# Patient Record
Sex: MALE | Race: BLACK OR AFRICAN AMERICAN | NOT HISPANIC OR LATINO | Employment: FULL TIME | URBAN - METROPOLITAN AREA
[De-identification: names, ages, dates, MRNs, and addresses within clinical notes are randomized per-mention and may not be internally consistent; named-entity substitution may affect disease eponyms.]

---

## 2022-02-14 ENCOUNTER — HOSPITAL ENCOUNTER (EMERGENCY)
Facility: HOSPITAL | Age: 42
Discharge: HOME | End: 2022-02-14
Attending: EMERGENCY MEDICINE
Payer: COMMERCIAL

## 2022-02-14 VITALS
TEMPERATURE: 98 F | DIASTOLIC BLOOD PRESSURE: 99 MMHG | BODY MASS INDEX: 25.9 KG/M2 | HEART RATE: 85 BPM | WEIGHT: 185 LBS | SYSTOLIC BLOOD PRESSURE: 151 MMHG | HEIGHT: 71 IN | OXYGEN SATURATION: 100 % | RESPIRATION RATE: 16 BRPM

## 2022-02-14 DIAGNOSIS — I10 UNCONTROLLED HYPERTENSION: Primary | ICD-10-CM

## 2022-02-14 DIAGNOSIS — K40.90 REDUCIBLE LEFT INGUINAL HERNIA: ICD-10-CM

## 2022-02-14 DIAGNOSIS — K42.9 UMBILICAL HERNIA WITHOUT OBSTRUCTION AND WITHOUT GANGRENE: ICD-10-CM

## 2022-02-14 LAB
ALBUMIN SERPL BCP-MCNC: 3.7 G/DL (ref 3.5–5.2)
ALP SERPL-CCNC: 70 U/L (ref 55–135)
ALT SERPL W/O P-5'-P-CCNC: 19 U/L (ref 10–44)
ANION GAP SERPL CALC-SCNC: 4 MMOL/L (ref 8–16)
AST SERPL-CCNC: 20 U/L (ref 10–40)
BILIRUB SERPL-MCNC: 0.4 MG/DL (ref 0.1–1)
BUN SERPL-MCNC: 9 MG/DL (ref 6–20)
CALCIUM SERPL-MCNC: 9 MG/DL (ref 8.7–10.5)
CHLORIDE SERPL-SCNC: 110 MMOL/L (ref 95–110)
CO2 SERPL-SCNC: 24 MMOL/L (ref 23–29)
CREAT SERPL-MCNC: 1.2 MG/DL (ref 0.5–1.4)
EST. GFR  (AFRICAN AMERICAN): >60 ML/MIN/1.73 M^2
EST. GFR  (NON AFRICAN AMERICAN): >60 ML/MIN/1.73 M^2
GLUCOSE SERPL-MCNC: 90 MG/DL (ref 70–110)
POTASSIUM SERPL-SCNC: 3.9 MMOL/L (ref 3.5–5.1)
PROT SERPL-MCNC: 6.7 G/DL (ref 6–8.4)
SODIUM SERPL-SCNC: 138 MMOL/L (ref 136–145)

## 2022-02-14 PROCEDURE — 96374 THER/PROPH/DIAG INJ IV PUSH: CPT

## 2022-02-14 PROCEDURE — 99284 EMERGENCY DEPT VISIT MOD MDM: CPT | Mod: 25

## 2022-02-14 PROCEDURE — 63600175 PHARM REV CODE 636 W HCPCS: Performed by: EMERGENCY MEDICINE

## 2022-02-14 PROCEDURE — 25000003 PHARM REV CODE 250: Performed by: EMERGENCY MEDICINE

## 2022-02-14 PROCEDURE — 80053 COMPREHEN METABOLIC PANEL: CPT | Performed by: EMERGENCY MEDICINE

## 2022-02-14 RX ORDER — ACETAMINOPHEN 500 MG
1000 TABLET ORAL
Status: COMPLETED | OUTPATIENT
Start: 2022-02-14 | End: 2022-02-14

## 2022-02-14 RX ORDER — AMLODIPINE BESYLATE 5 MG/1
10 TABLET ORAL
Status: COMPLETED | OUTPATIENT
Start: 2022-02-14 | End: 2022-02-14

## 2022-02-14 RX ORDER — CLONIDINE HYDROCHLORIDE 0.1 MG/1
0.1 TABLET ORAL
Status: COMPLETED | OUTPATIENT
Start: 2022-02-14 | End: 2022-02-14

## 2022-02-14 RX ORDER — AMLODIPINE BESYLATE 10 MG/1
10 TABLET ORAL DAILY
Qty: 30 TABLET | Refills: 0 | Status: SHIPPED | OUTPATIENT
Start: 2022-02-14 | End: 2023-02-14

## 2022-02-14 RX ORDER — HYDRALAZINE HYDROCHLORIDE 20 MG/ML
10 INJECTION INTRAMUSCULAR; INTRAVENOUS
Status: COMPLETED | OUTPATIENT
Start: 2022-02-14 | End: 2022-02-14

## 2022-02-14 RX ADMIN — CLONIDINE HYDROCHLORIDE 0.1 MG: 0.1 TABLET ORAL at 11:02

## 2022-02-14 RX ADMIN — HYDRALAZINE HYDROCHLORIDE 10 MG: 20 INJECTION, SOLUTION INTRAMUSCULAR; INTRAVENOUS at 11:02

## 2022-02-14 RX ADMIN — AMLODIPINE BESYLATE 10 MG: 5 TABLET ORAL at 11:02

## 2022-02-14 RX ADMIN — ACETAMINOPHEN 1000 MG: 500 TABLET ORAL at 11:02

## 2022-02-14 NOTE — ED TRIAGE NOTES
Pt c/o pain in abd around umbilicus and left lower quad where pt has hernias.  Pr reports constant pain is an 8/10.  Sneezing and coughing makes the pain worse if he doesn't  Brace himself.   Last BM this am, last intake was about 0200 this am.

## 2022-02-14 NOTE — ED PROVIDER NOTES
Encounter Date: 2/14/2022       History     Chief Complaint   Patient presents with    Hernia     Pt to triage stating he has a Hernia on his L side that has been hurting worse for the last couple weeks. Pt also is starting a new job and needs to be cleared for work.    Headache     Pt states he has a slight headache for the last couple days on and off     HPI   This 41-year-old white male presents to the emergency room complaining that he has a periumbilical and left inguinal hernia that is been present for years.  His hernias are starting to become uncomfortable.  There is no real abdominal pain nausea vomiting.  Patient also complains of some mild frontal head pain.  He complains that his blood pressures been high.  He is without other injuries or problems.   Review of patient's allergies indicates:  No Known Allergies  Past Medical History:   Diagnosis Date    Hypertension     Recurrent boils      Past Surgical History:   Procedure Laterality Date    CYST REMOVAL       Family History   Problem Relation Age of Onset    Liver cancer Mother      Social History     Tobacco Use    Smoking status: Current Every Day Smoker     Packs/day: 0.50     Types: Cigarettes    Smokeless tobacco: Never Used    Tobacco comment: pack can last up to a week   Substance Use Topics    Alcohol use: Yes     Comment: socially    Drug use: No     Review of Systems  The patient was questioned specifically with regard to the following.  General: Fever, chills, sweats. Neuro: Headache. Eyes: eye problems. ENT: Ear pain, sore throat. Cardiovascular: Chest pain. Respiratory: Cough, shortness of breath. Gastrointestinal: Abdominal pain, vomiting, diarrhea. Genitourinary: Painful urination.  Musculoskeletal: Arm and leg problems. Skin: Rash.  The review of systems was negative except for the following:  Frontal head pain, hernia around the belly button, hernia in the left inguinal region.  High blood pressure  Physical Exam     Initial  Vitals   BP Pulse Resp Temp SpO2   02/14/22 1035 02/14/22 1035 02/14/22 1035 02/14/22 1154 02/14/22 1035   (!) 182/118 95 16 98 °F (36.7 °C) 99 %      MAP       --                Physical Exam  The patient was examined specifically for the following:   General:No significant distress, Good color, Warm and dry. Head and neck:Scalp atraumatic, Neck supple. Neurological:Appropriate conversation, Gross motor deficits. Eyes:Conjugate gaze, Clear corneas. ENT: No epistaxis. Cardiac: Regular rate and rhythm, Grossly normal heart tones. Pulmonary: Wheezing, Rales. Gastrointestinal: Abdominal tenderness, Abdominal distention. Musculoskeletal: Extremity deformity, Apparent pain with range of motion of the joints. Skin: Rash.   The findings on examination were normal except for the following:  Patient's blood pressure is 182/118.  Lungs are clear and free of wheezing rales rubs and rhonchi.  Heart tones are normal.  The patient has regular rate rhythm.  The abdomen is soft.  Extremities are nontender there is no guarding rebound mass or distention.  The patient has a left inguinal hernia that is easily reducible.  He has a hernia just superior to his umbilicus that is easily reducible.  There is no significant abdominal tenderness.  Mental status examination, cranial nerves, motor and sensory examination are normal.  The neck is supple.  ED Course   Procedures  Labs Reviewed   COMPREHENSIVE METABOLIC PANEL - Abnormal; Notable for the following components:       Result Value    Anion Gap 4 (*)     All other components within normal limits          Imaging Results    None          Medications   cloNIDine tablet 0.1 mg (0.1 mg Oral Given 2/14/22 1111)   amLODIPine tablet 10 mg (10 mg Oral Given 2/14/22 1111)   acetaminophen tablet 1,000 mg (1,000 mg Oral Given 2/14/22 1112)   hydrALAZINE injection 10 mg (10 mg Intravenous Given 2/14/22 1132)                          Clinical Impression:   Final diagnoses:  [I10] Uncontrolled  hypertension (Primary)  [K42.9] Umbilical hernia without obstruction and without gangrene  [K40.90] Reducible left inguinal hernia          ED Disposition Condition    Discharge Stable        ED Prescriptions     Medication Sig Dispense Start Date End Date Auth. Provider    amLODIPine (NORVASC) 10 MG tablet Take 1 tablet (10 mg total) by mouth once daily. 30 tablet 2/14/2022 2/14/2023 Jewel Matamoros MD        Follow-up Information     Follow up With Specialties Details Why Contact Info    Hamlet Funk MD Emergency Medicine In 3 days For blood pressure management [address] [address]  [address]  [facility] [address]  [phone]      Og Moreno MD General Surgery, Surgery In 1 week Call for an appointment today [address]  [address] [place] [address] [id number] [phone]             Jewel Matamoros MD  02/14/22 [id number]

## 2022-02-14 NOTE — DISCHARGE INSTRUCTIONS
Please begin with taking amlodipine, 10 mg by mouth daily.  Please return immediately if you get worse or if new problems develop.  Please take your blood pressure 3 times a day and write the values down.  Please follow-up with your primary care doctor for follow-up on your blood pressure.  Please follow-up with the general surgeon above for your umbilical hernia and left inguinal hernia.

## 2022-02-15 ENCOUNTER — PES CALL (OUTPATIENT)
Dept: ADMINISTRATIVE | Facility: CLINIC | Age: 42
End: 2022-02-15
Payer: COMMERCIAL

## 2022-02-16 ENCOUNTER — OFFICE VISIT (OUTPATIENT)
Dept: SURGERY | Facility: CLINIC | Age: 42
End: 2022-02-16
Payer: COMMERCIAL

## 2022-02-16 VITALS
HEIGHT: 71 IN | BODY MASS INDEX: 25.89 KG/M2 | WEIGHT: 184.94 LBS | DIASTOLIC BLOOD PRESSURE: 107 MMHG | HEART RATE: 96 BPM | SYSTOLIC BLOOD PRESSURE: 165 MMHG

## 2022-02-16 DIAGNOSIS — K40.20 BILATERAL INGUINAL HERNIA WITHOUT OBSTRUCTION OR GANGRENE, RECURRENCE NOT SPECIFIED: Primary | ICD-10-CM

## 2022-02-16 DIAGNOSIS — K40.20 INGUINAL HERNIA BILATERAL, NON-RECURRENT: ICD-10-CM

## 2022-02-16 PROCEDURE — 99999 PR PBB SHADOW E&M-EST. PATIENT-LVL IV: CPT | Mod: PBBFAC,,, | Performed by: SURGERY

## 2022-02-16 PROCEDURE — 99999 PR PBB SHADOW E&M-EST. PATIENT-LVL IV: ICD-10-PCS | Mod: PBBFAC,,, | Performed by: SURGERY

## 2022-02-16 PROCEDURE — 99204 PR OFFICE/OUTPT VISIT, NEW, LEVL IV, 45-59 MIN: ICD-10-PCS | Mod: S$GLB,,, | Performed by: SURGERY

## 2022-02-16 PROCEDURE — 99204 OFFICE O/P NEW MOD 45 MIN: CPT | Mod: S$GLB,,, | Performed by: SURGERY

## 2022-02-16 RX ORDER — LIDOCAINE HYDROCHLORIDE 10 MG/ML
1 INJECTION, SOLUTION EPIDURAL; INFILTRATION; INTRACAUDAL; PERINEURAL ONCE
Status: CANCELLED | OUTPATIENT
Start: 2022-02-16 | End: 2022-02-16

## 2022-02-16 RX ORDER — SODIUM CHLORIDE 9 MG/ML
INJECTION, SOLUTION INTRAVENOUS CONTINUOUS
Status: CANCELLED | OUTPATIENT
Start: 2022-02-16

## 2022-02-16 NOTE — PROGRESS NOTES
History and Physical Exam    Patient ID: Ty Jorge is a 41 y.o. male.    Chief Complaint: Hernia (Inguinal )      HPI:  40 y/o man with history of few years of inguinal buldge, now with buldges at bilateral inguinal areas, and umbilical area      Review of Systems   Constitutional: Negative for chills and unexpected weight change.   HENT: Negative for congestion, ear pain and sore throat.    Eyes: Negative for pain and redness.   Respiratory: Negative for cough and shortness of breath.    Cardiovascular: Negative for chest pain and palpitations.   Gastrointestinal: Negative for abdominal distention, abdominal pain and constipation.   Endocrine: Negative for cold intolerance and heat intolerance.   Genitourinary: Negative for dysuria and frequency.   Musculoskeletal: Negative for back pain and neck pain.   Skin: Negative for pallor and rash.   Neurological: Negative for syncope, light-headedness and headaches.   Hematological: Negative for adenopathy. Does not bruise/bleed easily.   Psychiatric/Behavioral: Negative for confusion and hallucinations.       Current Outpatient Medications   Medication Sig Dispense Refill    amLODIPine (NORVASC) 10 MG tablet Take 1 tablet (10 mg total) by mouth once daily. 30 tablet 0    amoxicillin (AMOXIL) 500 MG capsule TK ONE C PO TID  0    cyclobenzaprine (FLEXERIL) 5 MG tablet TK 1 T PO TID PRF MUSCLE SPASM  1    docusate sodium (COLACE) 100 MG capsule Take 1 capsule (100 mg total) by mouth 2 (two) times daily as needed for Constipation. 30 capsule 0    mupirocin (BACTROBAN) 2 % ointment ARJUN TOPICALLY AA TID FOR 7 DAYS  0    naproxen (NAPROSYN) 500 MG tablet Take 1 tablet (500 mg total) by mouth 2 (two) times daily with meals. 60 tablet 0    omeprazole (PRILOSEC) 20 MG capsule TK 2 CS PO 30 MINUTES BEFORE MONRNING MEAL  3     No current facility-administered medications for this visit.       Review of patient's allergies indicates:  No Known Allergies    Past Medical  History:   Diagnosis Date    Hypertension     Recurrent boils        Past Surgical History:   Procedure Laterality Date    CYST REMOVAL         Family History   Problem Relation Age of Onset    Liver cancer Mother        Social History     Socioeconomic History    Marital status:    Tobacco Use    Smoking status: Current Every Day Smoker     Packs/day: 0.50     Types: Cigarettes    Smokeless tobacco: Never Used    Tobacco comment: pack can last up to a week   Substance and Sexual Activity    Alcohol use: Yes     Comment: socially    Drug use: No       Vitals:    02/16/22 1119   BP: (!) 165/107   Pulse: 96       Physical Exam  Constitutional:       Appearance: He is well-developed and well-nourished.   HENT:      Head: Normocephalic and atraumatic.   Eyes:      Extraocular Movements: EOM normal.      Pupils: Pupils are equal, round, and reactive to light.   Cardiovascular:      Rate and Rhythm: Normal rate and regular rhythm.      Heart sounds: No murmur heard.      Pulmonary:      Effort: Pulmonary effort is normal.      Breath sounds: Normal breath sounds. No wheezing.   Abdominal:      General: There is no distension.      Palpations: Abdomen is soft.      Tenderness: There is no abdominal tenderness.      Hernia: A hernia (hernias present at bilateral inguinal areas and umbilacl area, reducible and non tender) is present.   Musculoskeletal:         General: Normal range of motion.      Cervical back: Normal range of motion and neck supple.   Skin:     General: Skin is warm and dry.      Capillary Refill: Capillary refill takes less than 2 seconds.      Findings: No rash.   Neurological:      Mental Status: He is alert and oriented to person, place, and time.   Psychiatric:         Mood and Affect: Mood and affect normal.         Judgment: Judgment normal.         Assessment & Plan:    bilateral inguinal hernias, and umbilical hernia, plan lap repair of all hernias     Risks, benefits,  alternatives to the procedure were discussed with the patient, who appears to understand and agree with the treatment plan.

## 2022-02-21 ENCOUNTER — HOSPITAL ENCOUNTER (OUTPATIENT)
Dept: PREADMISSION TESTING | Facility: HOSPITAL | Age: 42
Discharge: HOME | End: 2022-02-21
Attending: SURGERY
Payer: COMMERCIAL

## 2022-02-21 ENCOUNTER — ANESTHESIA EVENT (OUTPATIENT)
Dept: SURGERY | Facility: HOSPITAL | Age: 42
End: 2022-02-21
Payer: COMMERCIAL

## 2022-02-21 VITALS
SYSTOLIC BLOOD PRESSURE: 135 MMHG | WEIGHT: 196.44 LBS | BODY MASS INDEX: 27.5 KG/M2 | OXYGEN SATURATION: 99 % | HEIGHT: 71 IN | HEART RATE: 97 BPM | TEMPERATURE: 98 F | RESPIRATION RATE: 16 BRPM | DIASTOLIC BLOOD PRESSURE: 88 MMHG

## 2022-02-21 DIAGNOSIS — Z01.812 ENCOUNTER FOR PREOPERATIVE SCREENING LABORATORY TESTING FOR COVID-19 VIRUS: ICD-10-CM

## 2022-02-21 DIAGNOSIS — Z11.52 ENCOUNTER FOR PREOPERATIVE SCREENING LABORATORY TESTING FOR COVID-19 VIRUS: ICD-10-CM

## 2022-02-21 DIAGNOSIS — Z01.818 PREOPERATIVE TESTING: Primary | ICD-10-CM

## 2022-02-21 LAB
BASOPHILS # BLD AUTO: 0.04 K/UL (ref 0–0.2)
BASOPHILS NFR BLD: 0.9 % (ref 0–1.9)
DIFFERENTIAL METHOD: ABNORMAL
EOSINOPHIL # BLD AUTO: 0.4 K/UL (ref 0–0.5)
EOSINOPHIL NFR BLD: 7.6 % (ref 0–8)
ERYTHROCYTE [DISTWIDTH] IN BLOOD BY AUTOMATED COUNT: 12.6 % (ref 11.5–14.5)
HCT VFR BLD AUTO: 44.1 % (ref 40–54)
HGB BLD-MCNC: 15.1 G/DL (ref 14–18)
IMM GRANULOCYTES # BLD AUTO: 0.01 K/UL (ref 0–0.04)
IMM GRANULOCYTES NFR BLD AUTO: 0.2 % (ref 0–0.5)
LYMPHOCYTES # BLD AUTO: 2.6 K/UL (ref 1–4.8)
LYMPHOCYTES NFR BLD: 56.2 % (ref 18–48)
MCH RBC QN AUTO: 30.9 PG (ref 27–31)
MCHC RBC AUTO-ENTMCNC: 34.2 G/DL (ref 32–36)
MCV RBC AUTO: 90 FL (ref 82–98)
MONOCYTES # BLD AUTO: 0.4 K/UL (ref 0.3–1)
MONOCYTES NFR BLD: 8 % (ref 4–15)
NEUTROPHILS # BLD AUTO: 1.3 K/UL (ref 1.8–7.7)
NEUTROPHILS NFR BLD: 27.1 % (ref 38–73)
NRBC BLD-RTO: 0 /100 WBC
PLATELET # BLD AUTO: 226 K/UL (ref 150–450)
PMV BLD AUTO: 9.5 FL (ref 9.2–12.9)
RBC # BLD AUTO: 4.89 M/UL (ref 4.6–6.2)
SARS-COV-2 RDRP RESP QL NAA+PROBE: NEGATIVE
WBC # BLD AUTO: 4.61 K/UL (ref 3.9–12.7)

## 2022-02-21 PROCEDURE — 93005 ELECTROCARDIOGRAM TRACING: CPT

## 2022-02-21 PROCEDURE — 93010 ELECTROCARDIOGRAM REPORT: CPT | Mod: ,,, | Performed by: INTERNAL MEDICINE

## 2022-02-21 PROCEDURE — U0002 COVID-19 LAB TEST NON-CDC: HCPCS | Performed by: SURGERY

## 2022-02-21 PROCEDURE — 36415 COLL VENOUS BLD VENIPUNCTURE: CPT | Performed by: SURGERY

## 2022-02-21 PROCEDURE — 93010 EKG 12-LEAD: ICD-10-PCS | Mod: ,,, | Performed by: INTERNAL MEDICINE

## 2022-02-21 PROCEDURE — 85025 COMPLETE CBC W/AUTO DIFF WBC: CPT | Performed by: SURGERY

## 2022-02-21 NOTE — DISCHARGE INSTRUCTIONS
Before 7 AM, enter through the Emergency Entrance..   After 7 AM enter through the Main Entrance.      Your procedure  is scheduled for ___2/22/22_______.    Call [phone] between 2pm and 5pm on __today_____to find out your arrival time for the day of surgery.    You may use the main entrance to the hospital on the [facility] side, or the entrance that is next to the garage.    You may have one visitor.  Visiting hours for non-COVID-19 patients expanded to 24/7 (still restricted to one visitor)  Youth visitation changed from age 18 to age 12.      You will be going to the Same Day Surgery Unit on the 2nd floor of the hospital.    Important instructions:  Do not eat or drink after 12 midnight, including water.  It is okay to brush your teeth.  Do not have gum, candy or mints.    SEE MEDICATION SHEET.   TAKE MEDICATIONS AS DIRECTED WITH SIPS OF WATER.      STOP taking Aspirin, Ibuprofen,  Advil, Motrin, Mobic(meloxicam), Aleve (naproxen), Fish oil, and Vitamin E for at least 7 days before your surgery.     You may take Tylenol if needed which is not a blood thinner.    Please shower the night before and the morning of your surgery.      Use Hibiclens soap as instructed by your pre op nurse.   Please place clean linens on your bed the night before surgery. Please wear fresh clean clothing after each shower.    No shaving of procedural area at least 4-5 days before surgery due to increased risk of skin irritation and/or possible infection.    Contact lenses and removable denture work may not be worn during your procedure.    You may wear deodorant only. If you are having breast surgery, do not wear deodorant on the operative side.    Do not wear powder, body lotion, perfume/cologne or make-up.    Do not wear any jewelry or have any metal on your body.    You will be asked to remove any dentures or partials for the procedure.    If you are going home on the same day of surgery, you must arrange for a  family member or a friend to drive you home.  Public transportation is prohibited.  You will not be able to drive home if you were given anesthesia or sedation.    Patients who want to have their Post-op prescriptions filled from our in-house [facility], bring a Credit/Debit Card  or cash with you. A co-pay may be required.  The pharmacy closes at 5:30 pm.    Wear loose fitting clothes allowing for bandages.    Please leave money and valuables home.      You may bring your cell phone.    Call the doctor if fever or illness should occur before your surgery.    Call [phone] to contact us here if needed.

## 2022-02-22 ENCOUNTER — ANESTHESIA (OUTPATIENT)
Dept: SURGERY | Facility: HOSPITAL | Age: 42
End: 2022-02-22
Payer: COMMERCIAL

## 2022-02-22 ENCOUNTER — HOSPITAL ENCOUNTER (OUTPATIENT)
Facility: HOSPITAL | Age: 42
Discharge: HOME | End: 2022-02-22
Attending: SURGERY | Admitting: SURGERY
Payer: COMMERCIAL

## 2022-02-22 VITALS
BODY MASS INDEX: 27.39 KG/M2 | OXYGEN SATURATION: 98 % | HEART RATE: 86 BPM | SYSTOLIC BLOOD PRESSURE: 122 MMHG | DIASTOLIC BLOOD PRESSURE: 77 MMHG | WEIGHT: 196.38 LBS | RESPIRATION RATE: 18 BRPM | TEMPERATURE: 98 F

## 2022-02-22 DIAGNOSIS — Z01.812 ENCOUNTER FOR PREOPERATIVE SCREENING LABORATORY TESTING FOR COVID-19 VIRUS: ICD-10-CM

## 2022-02-22 DIAGNOSIS — K40.20 INGUINAL HERNIA BILATERAL, NON-RECURRENT: ICD-10-CM

## 2022-02-22 DIAGNOSIS — Z11.52 ENCOUNTER FOR PREOPERATIVE SCREENING LABORATORY TESTING FOR COVID-19 VIRUS: ICD-10-CM

## 2022-02-22 DIAGNOSIS — K40.20 BILATERAL INGUINAL HERNIA WITHOUT OBSTRUCTION OR GANGRENE, RECURRENCE NOT SPECIFIED: ICD-10-CM

## 2022-02-22 DIAGNOSIS — L02.31 LEFT BUTTOCK ABSCESS: Primary | ICD-10-CM

## 2022-02-22 LAB — POCT GLUCOSE: 95 MG/DL (ref 70–110)

## 2022-02-22 PROCEDURE — 36000707: Performed by: SURGERY

## 2022-02-22 PROCEDURE — 11770 EXC PILONIDAL CYST SIMPLE: CPT | Mod: ,,, | Performed by: SURGERY

## 2022-02-22 PROCEDURE — 88304 TISSUE EXAM BY PATHOLOGIST: CPT | Mod: 26,,, | Performed by: PATHOLOGY

## 2022-02-22 PROCEDURE — 25000003 PHARM REV CODE 250: Performed by: ANESTHESIOLOGY

## 2022-02-22 PROCEDURE — 25000003 PHARM REV CODE 250: Performed by: NURSE ANESTHETIST, CERTIFIED REGISTERED

## 2022-02-22 PROCEDURE — 27201423 OPTIME MED/SURG SUP & DEVICES STERILE SUPPLY: Performed by: SURGERY

## 2022-02-22 PROCEDURE — 71000033 HC RECOVERY, INTIAL HOUR: Performed by: SURGERY

## 2022-02-22 PROCEDURE — 63600175 PHARM REV CODE 636 W HCPCS: Performed by: SURGERY

## 2022-02-22 PROCEDURE — D9220A PRA ANESTHESIA: Mod: ANES,,, | Performed by: ANESTHESIOLOGY

## 2022-02-22 PROCEDURE — 71000039 HC RECOVERY, EACH ADD'L HOUR: Performed by: SURGERY

## 2022-02-22 PROCEDURE — D9220A PRA ANESTHESIA: ICD-10-PCS | Mod: ANES,,, | Performed by: ANESTHESIOLOGY

## 2022-02-22 PROCEDURE — 11770 PR REMV PILONIDAL LESION SIMPLE: ICD-10-PCS | Mod: ,,, | Performed by: SURGERY

## 2022-02-22 PROCEDURE — 36000706: Performed by: SURGERY

## 2022-02-22 PROCEDURE — D9220A PRA ANESTHESIA: Mod: CRNA,,, | Performed by: NURSE ANESTHETIST, CERTIFIED REGISTERED

## 2022-02-22 PROCEDURE — 82962 GLUCOSE BLOOD TEST: CPT | Performed by: SURGERY

## 2022-02-22 PROCEDURE — 25000003 PHARM REV CODE 250: Performed by: SURGERY

## 2022-02-22 PROCEDURE — 37000008 HC ANESTHESIA 1ST 15 MINUTES: Performed by: SURGERY

## 2022-02-22 PROCEDURE — 88304 PR  SURG PATH,LEVEL III: ICD-10-PCS | Mod: 26,,, | Performed by: PATHOLOGY

## 2022-02-22 PROCEDURE — 63600175 PHARM REV CODE 636 W HCPCS: Performed by: ANESTHESIOLOGY

## 2022-02-22 PROCEDURE — 63600175 PHARM REV CODE 636 W HCPCS: Performed by: NURSE ANESTHETIST, CERTIFIED REGISTERED

## 2022-02-22 PROCEDURE — 71000015 HC POSTOP RECOV 1ST HR: Performed by: SURGERY

## 2022-02-22 PROCEDURE — D9220A PRA ANESTHESIA: ICD-10-PCS | Mod: CRNA,,, | Performed by: NURSE ANESTHETIST, CERTIFIED REGISTERED

## 2022-02-22 PROCEDURE — 37000009 HC ANESTHESIA EA ADD 15 MINS: Performed by: SURGERY

## 2022-02-22 PROCEDURE — 88304 TISSUE EXAM BY PATHOLOGIST: CPT | Performed by: PATHOLOGY

## 2022-02-22 PROCEDURE — 71000016 HC POSTOP RECOV ADDL HR: Performed by: SURGERY

## 2022-02-22 RX ORDER — SODIUM CHLORIDE 0.9 % (FLUSH) 0.9 %
10 SYRINGE (ML) INJECTION
Status: DISCONTINUED | OUTPATIENT
Start: 2022-02-22 | End: 2022-02-22 | Stop reason: HOSPADM

## 2022-02-22 RX ORDER — LIDOCAINE HYDROCHLORIDE 20 MG/ML
INJECTION INTRAVENOUS
Status: DISCONTINUED | OUTPATIENT
Start: 2022-02-22 | End: 2022-02-22

## 2022-02-22 RX ORDER — HYDROMORPHONE HYDROCHLORIDE 2 MG/ML
0.2 INJECTION, SOLUTION INTRAMUSCULAR; INTRAVENOUS; SUBCUTANEOUS EVERY 5 MIN PRN
Status: DISCONTINUED | OUTPATIENT
Start: 2022-02-22 | End: 2022-02-22 | Stop reason: HOSPADM

## 2022-02-22 RX ORDER — SUCCINYLCHOLINE CHLORIDE 20 MG/ML
INJECTION INTRAMUSCULAR; INTRAVENOUS
Status: DISCONTINUED | OUTPATIENT
Start: 2022-02-22 | End: 2022-02-22

## 2022-02-22 RX ORDER — SODIUM CHLORIDE 9 MG/ML
INJECTION, SOLUTION INTRAVENOUS CONTINUOUS
Status: DISCONTINUED | OUTPATIENT
Start: 2022-02-22 | End: 2022-02-22 | Stop reason: HOSPADM

## 2022-02-22 RX ORDER — ONDANSETRON 2 MG/ML
4 INJECTION INTRAMUSCULAR; INTRAVENOUS DAILY PRN
Status: DISCONTINUED | OUTPATIENT
Start: 2022-02-22 | End: 2022-02-22 | Stop reason: HOSPADM

## 2022-02-22 RX ORDER — FENTANYL CITRATE 50 UG/ML
INJECTION, SOLUTION INTRAMUSCULAR; INTRAVENOUS
Status: DISCONTINUED | OUTPATIENT
Start: 2022-02-22 | End: 2022-02-22

## 2022-02-22 RX ORDER — OXYCODONE AND ACETAMINOPHEN 5; 325 MG/1; MG/1
1 TABLET ORAL EVERY 4 HOURS PRN
Qty: 30 TABLET | Refills: 0 | Status: SHIPPED | OUTPATIENT
Start: 2022-02-22 | End: 2022-03-09 | Stop reason: SDUPTHER

## 2022-02-22 RX ORDER — ROCURONIUM BROMIDE 10 MG/ML
INJECTION, SOLUTION INTRAVENOUS
Status: DISCONTINUED | OUTPATIENT
Start: 2022-02-22 | End: 2022-02-22

## 2022-02-22 RX ORDER — OXYCODONE AND ACETAMINOPHEN 5; 325 MG/1; MG/1
1 TABLET ORAL ONCE
Status: COMPLETED | OUTPATIENT
Start: 2022-02-22 | End: 2022-02-22

## 2022-02-22 RX ORDER — PROPOFOL 10 MG/ML
VIAL (ML) INTRAVENOUS
Status: DISCONTINUED | OUTPATIENT
Start: 2022-02-22 | End: 2022-02-22

## 2022-02-22 RX ORDER — LIDOCAINE HYDROCHLORIDE 10 MG/ML
1 INJECTION, SOLUTION EPIDURAL; INFILTRATION; INTRACAUDAL; PERINEURAL ONCE
Status: DISCONTINUED | OUTPATIENT
Start: 2022-02-22 | End: 2022-02-22 | Stop reason: HOSPADM

## 2022-02-22 RX ORDER — ACETAMINOPHEN 500 MG
1000 TABLET ORAL
Status: COMPLETED | OUTPATIENT
Start: 2022-02-22 | End: 2022-02-22

## 2022-02-22 RX ORDER — DEXAMETHASONE SODIUM PHOSPHATE 4 MG/ML
INJECTION, SOLUTION INTRA-ARTICULAR; INTRALESIONAL; INTRAMUSCULAR; INTRAVENOUS; SOFT TISSUE
Status: DISCONTINUED | OUTPATIENT
Start: 2022-02-22 | End: 2022-02-22

## 2022-02-22 RX ORDER — BUPIVACAINE HYDROCHLORIDE AND EPINEPHRINE 2.5; 5 MG/ML; UG/ML
INJECTION, SOLUTION EPIDURAL; INFILTRATION; INTRACAUDAL; PERINEURAL
Status: DISCONTINUED | OUTPATIENT
Start: 2022-02-22 | End: 2022-02-22 | Stop reason: HOSPADM

## 2022-02-22 RX ORDER — LIDOCAINE HYDROCHLORIDE 10 MG/ML
1 INJECTION, SOLUTION EPIDURAL; INFILTRATION; INTRACAUDAL; PERINEURAL ONCE
Status: DISCONTINUED | OUTPATIENT
Start: 2022-02-22 | End: 2022-02-22

## 2022-02-22 RX ORDER — CEFAZOLIN SODIUM 2 G/50ML
2 SOLUTION INTRAVENOUS
Status: COMPLETED | OUTPATIENT
Start: 2022-02-22 | End: 2022-02-22

## 2022-02-22 RX ORDER — LABETALOL HYDROCHLORIDE 5 MG/ML
INJECTION, SOLUTION INTRAVENOUS
Status: DISCONTINUED | OUTPATIENT
Start: 2022-02-22 | End: 2022-02-22

## 2022-02-22 RX ORDER — MIDAZOLAM HYDROCHLORIDE 1 MG/ML
INJECTION, SOLUTION INTRAMUSCULAR; INTRAVENOUS
Status: DISCONTINUED | OUTPATIENT
Start: 2022-02-22 | End: 2022-02-22

## 2022-02-22 RX ORDER — ONDANSETRON 2 MG/ML
INJECTION INTRAMUSCULAR; INTRAVENOUS
Status: DISCONTINUED | OUTPATIENT
Start: 2022-02-22 | End: 2022-02-22

## 2022-02-22 RX ORDER — SODIUM CHLORIDE, SODIUM LACTATE, POTASSIUM CHLORIDE, CALCIUM CHLORIDE 600; 310; 30; 20 MG/100ML; MG/100ML; MG/100ML; MG/100ML
INJECTION, SOLUTION INTRAVENOUS CONTINUOUS
Status: DISCONTINUED | OUTPATIENT
Start: 2022-02-22 | End: 2022-02-22 | Stop reason: HOSPADM

## 2022-02-22 RX ADMIN — SODIUM CHLORIDE, SODIUM LACTATE, POTASSIUM CHLORIDE, AND CALCIUM CHLORIDE: .6; .31; .03; .02 INJECTION, SOLUTION INTRAVENOUS at 07:02

## 2022-02-22 RX ADMIN — SUCCINYLCHOLINE CHLORIDE 160 MG: 20 INJECTION, SOLUTION INTRAMUSCULAR; INTRAVENOUS at 09:02

## 2022-02-22 RX ADMIN — HYDROMORPHONE HYDROCHLORIDE 0.2 MG: 2 INJECTION INTRAMUSCULAR; INTRAVENOUS; SUBCUTANEOUS at 10:02

## 2022-02-22 RX ADMIN — PROPOFOL 100 MG: 10 INJECTION, EMULSION INTRAVENOUS at 09:02

## 2022-02-22 RX ADMIN — LABETALOL HYDROCHLORIDE 5 MG: 5 INJECTION, SOLUTION INTRAVENOUS at 09:02

## 2022-02-22 RX ADMIN — SUGAMMADEX 200 MG: 100 INJECTION, SOLUTION INTRAVENOUS at 09:02

## 2022-02-22 RX ADMIN — MIDAZOLAM HYDROCHLORIDE 2 MG: 1 INJECTION, SOLUTION INTRAMUSCULAR; INTRAVENOUS at 08:02

## 2022-02-22 RX ADMIN — CEFAZOLIN SODIUM 2 G: 2 SOLUTION INTRAVENOUS at 09:02

## 2022-02-22 RX ADMIN — GLYCOPYRROLATE 0.2 MG: 0.2 INJECTION, SOLUTION INTRAMUSCULAR; INTRAVITREAL at 08:02

## 2022-02-22 RX ADMIN — ACETAMINOPHEN 1000 MG: 500 TABLET ORAL at 07:02

## 2022-02-22 RX ADMIN — PROPOFOL 200 MG: 10 INJECTION, EMULSION INTRAVENOUS at 09:02

## 2022-02-22 RX ADMIN — FENTANYL CITRATE 100 MCG: 50 INJECTION, SOLUTION INTRAMUSCULAR; INTRAVENOUS at 09:02

## 2022-02-22 RX ADMIN — ROCURONIUM BROMIDE 30 MG: 10 INJECTION, SOLUTION INTRAVENOUS at 09:02

## 2022-02-22 RX ADMIN — DEXAMETHASONE SODIUM PHOSPHATE 4 MG: 4 INJECTION, SOLUTION INTRAMUSCULAR; INTRAVENOUS at 09:02

## 2022-02-22 RX ADMIN — LABETALOL HYDROCHLORIDE 10 MG: 5 INJECTION, SOLUTION INTRAVENOUS at 09:02

## 2022-02-22 RX ADMIN — ONDANSETRON 4 MG: 2 INJECTION, SOLUTION INTRAMUSCULAR; INTRAVENOUS at 09:02

## 2022-02-22 RX ADMIN — OXYCODONE AND ACETAMINOPHEN 1 TABLET: 5; 325 TABLET ORAL at 12:02

## 2022-02-22 RX ADMIN — LIDOCAINE HYDROCHLORIDE 100 MG: 20 INJECTION, SOLUTION INTRAVENOUS at 09:02

## 2022-02-22 NOTE — DISCHARGE INSTRUCTIONS
[facility]  Office # [phone]    Discharge Instructions for Same Day Surgery     Call the office for and appointment if one has not already been made.     Diet: Drink plenty of fluids the first 48 hours and you may resume your   usual diet.     Activity: No heavy lifting (over 10 pounds), pushing or pulling until your   post op visit. Your doctor's office may have told you to limit your lifting to less weight, or even no weight.  Be sure to follow those instructions.    Note: You may ride in a car and you may drive when comfortable.     Do not drive, drink alcohol, or sign legal documents for 24 hours, or if taking narcotic pain medication.    You may shower 24 hours after surgery and you may wash your hair.       Medical: Call the doctor for any of the following problems: fever above 101,   severe pain, bleeding, or abdominal distention (swelling).   If constipated you may take any stool softener you choose.     Occasionally small areas of skin numbness or an unpleasant skin sensation can result. Also, you may find that your incision is swollen and tender for a few days.  Some redness around sutures and staples is a normal reaction, but if the discomfort persists or worsens, call you doctor.                       Fall Prevention  Millions of people fall every year and injure themselves. You may have had anesthesia or sedation which may increase your risk of falling. You may have health issues that put you at an increased risk of falling.     Here are ways to reduce your risk of falling.    Make your home safe by keeping walkways clear of objects you may trip over.  Use non-slip pads under rugs. Do not use area rugs or small throw rugs.  Use non-slip mats in bathtubs and showers.  Install handrails and lights on staircases.  Do not walk in poorly lit areas.  Do not stand on chairs or wobbly ladders.  Use caution when reaching overhead or looking upward. This position can cause a loss of  balance.  Be sure your shoes fit properly, have non-slip bottoms and are in good condition.   Wear shoes both inside and out. Avoid going barefoot or wearing slippers.  Be cautious when going up and down stairs, curbs, and when walking on uneven sidewalks.  If your balance is poor, consider using a cane or walker.  If your fall was related to alcohol use, stop or limit alcohol intake.   If your fall was related to use of sleeping medicines, talk to your doctor about this. You may need to reduce your dosage at bedtime if you awaken during the night to go to the bathroom.    To reduce the need for nighttime bathroom trips:  Avoid drinking fluids for several hours before going to bed  Empty your bladder before going to bed  Men can keep a urinal at the bedside  Stay as active as you can. Balance, flexibility, strength, and endurance all come from exercise. They all play a role in preventing falls. Ask your healthcare provider which types of activity are right for you.  Get your vision checked on a regular basis.  If you have pets, know where they are before you stand up or walk so you don't trip over them.  Use night lights.

## 2022-02-22 NOTE — OP NOTE
.DATE OF PROCEDURE:  2/22/2022    PREOPERATIVE DIAGNOSIS: buttock abscess    POSTOPERATIVE DIAGNOSIS: Pilonidal cyst.     PROCEDURE: Excision pilonidal cyst.     SURGEON: Og Moreno MD    ANESTHESIA: General endotracheal anesthesia.     ESTIMATED BLOOD LOSS: Minimal.     SPECIMEN: Pilonidal cyst to Pathology.     COMPLICATIONS: None.     INDICATIONS: A 40 y/o man with complaints of presacral pain and   discomfort associated with some drainage for the past few weeks, gradually   causing discomfort for which the patient requires surgical removal of the   pilonidal cyst.     BRIEF OPERATIVE NOTE: The risks of the surgery including bleeding, infection,   scar, damage to the nearby structures including vessels, nerves, incontinence,   recurrence, need for additional surgery, thrombosis and embolism were explained   to the patient in detail who understood the procedure well and gave us the permission.     After informed consent was obtained, the patient was taken to the Operating Room   and placed supine on the operating table. SCDs were placed. After general   anesthesia was established, the patient was placed in the lateral position. The   presacral area was then prepped and draped in standard surgical fashion. There   were a single punctums along the gluteal fold. An elliptical incision   was made and carried down to the deeper tissues. All the hair bearing skin was   resected using Bovie electrocautery. At the superior end, there was a bunch of   granulation tissue and the cyst extending laterally to the left side.   This was resected with a rim of margin using Bovie electrocautery and the   dressing was removed and passed off the field as a specimen. The area was   irrigated with normal saline solution. There was minimal oozing, which was   controlled with Bovie electrocautery. Again, the area was irrigated. No oozing   was seen. The wound was clean and dried and a sterile   dressing was applied.     The sponge,  needle and instrument counts were correct x2 at the end of the case.     The patient tolerated the procedure well and was successfully extubated and  transported to the Recovery Room in a stable condition.

## 2022-02-22 NOTE — PLAN OF CARE
Preop plan of care reviewed.  Questions encouraged and questions answered. Pt verbalized readiness to proceed.      [id number]  Dr. Romero notified of bp 171/105.  Dr. Moreno messaged about leaking cyst in gluteal fold.

## 2022-02-22 NOTE — TRANSFER OF CARE
Anesthesia Transfer of Care Note    Patient: Ty Jorge    Procedure(s) Performed: Procedure(s) (LRB):  REPAIR, HERNIA, INGUINAL, BILATERAL, LAPAROSCOPIC (Bilateral)    Patient location: PACU    Anesthesia Type: general    Transport from OR: Transported from OR on room air with adequate spontaneous ventilation    Post pain: adequate analgesia    Post assessment: no apparent anesthetic complications and tolerated procedure well    Post vital signs: stable    Level of consciousness: sedated and responds to stimulation    Nausea/Vomiting: no nausea/vomiting    Complications: none    Transfer of care protocol was followed      Last vitals:   Visit Vitals  BP (!) 149/99 (BP Location: Right arm)   Pulse 91   Temp 35.9 °C (96.6 °F) (Axillary)   Resp 17   Wt 89.1 kg (196 lb 6 oz)   SpO2 100%   BMI 27.39 kg/m²

## 2022-02-22 NOTE — PLAN OF CARE
S/p pilonidal cyst excision. Avril 10/10 Patient is AAO x 4 and his vitals are stable. Incisional dressing (packed w/ iodoform strips, 4x4 gauze, abd pads, medi pore tape, mesh brief) is intact w/ a small amount of serosanguineous drainage present. Pain is controlled and he denies nausea. Recovery care is complete. Patient is awaiting transfer to hospitals.

## 2022-02-22 NOTE — ANESTHESIA PREPROCEDURE EVALUATION
02/22/2022  Ty Jorge is a 41 y.o., male with HTN and bilateral inguinal hernias.      Pre-op Assessment    I have reviewed the Patient Summary Reports.    I have reviewed the NPO Status.   I have reviewed the Medications.     Review of Systems  Anesthesia Hx:  No previous Anesthesia  Denies Family Hx of Anesthesia complications.    Social:  Smoker, No Alcohol Use    Hematology/Oncology:  Hematology Normal        Cardiovascular:   Exercise tolerance: good Hypertension Denies MI.   Denies Dysrhythmias.   Denies Angina.     BPs at home 120-130s/80-90s   Pulmonary:  Pulmonary Normal    Renal/:  Renal/ Normal     Hepatic/GI:  Hepatic/GI Normal    Musculoskeletal:  Musculoskeletal Normal    Neurological:  Neurology Normal    Endocrine:  Endocrine Normal        Physical Exam  General: Well nourished, Alert and Oriented    Airway:  Mallampati: II   Mouth Opening: Normal  Tongue: Normal  Neck ROM: Normal ROM    Dental:    Chest/Lungs:  Normal Respiratory Rate, Clear to auscultation    Heart:  Rate: Normal  Rhythm: Regular Rhythm      Wt Readings from Last 3 Encounters:   02/21/22 89.1 kg (196 lb 6 oz)   02/21/22 89.1 kg (196 lb 6.9 oz)   02/16/22 83.9 kg (184 lb 15.5 oz)     Temp Readings from Last 3 Encounters:   02/22/22 36.9 °C (98.4 °F) (Oral)   02/21/22 36.7 °C (98.1 °F) (Oral)   02/14/22 36.7 °C (98.1 °F) (Oral)     BP Readings from Last 3 Encounters:   02/22/22 (!) 171/105   02/21/22 135/88   02/16/22 (!) 165/107     Pulse Readings from Last 3 Encounters:   02/22/22 87   02/21/22 97   02/16/22 96     Lab Results   Component Value Date    WBC 4.61 02/21/2022    HGB 15.1 02/21/2022    HCT 44.1 02/21/2022    MCV 90 02/21/2022     02/21/2022       CMP  Sodium   Date Value Ref Range Status   02/14/2022 138 136 - 145 mmol/L Final     Potassium   Date Value Ref Range Status   02/14/2022 3.9 3.5 - 5.1 mmol/L Final     Chloride   Date Value Ref Range Status   02/14/2022 110 95 - 110 mmol/L Final     CO2    Date Value Ref Range Status   02/14/2022 24 23 - 29 mmol/L Final     Glucose   Date Value Ref Range Status   02/14/2022 90 70 - 110 mg/dL Final     BUN   Date Value Ref Range Status   02/14/2022 9 6 - 20 mg/dL Final     Creatinine   Date Value Ref Range Status   02/14/2022 1.2 0.5 - 1.4 mg/dL Final     Calcium   Date Value Ref Range Status   02/14/2022 9.0 8.7 - 10.5 mg/dL Final     Total Protein   Date Value Ref Range Status   02/14/2022 6.7 6.0 - 8.4 g/dL Final     Albumin   Date Value Ref Range Status   02/14/2022 3.7 3.5 - 5.2 g/dL Final     Total Bilirubin   Date Value Ref Range Status   02/14/2022 0.4 0.1 - 1.0 mg/dL Final     Comment:     For infants and newborns, interpretation of results should be based  on gestational age, weight and in agreement with clinical  observations.    Premature Infant recommended reference ranges:  Up to 24 hours.............<8.0 mg/dL  Up to 48 hours............<12.0 mg/dL  3-5 days..................<15.0 mg/dL  6-29 days.................<15.0 mg/dL       Alkaline Phosphatase   Date Value Ref Range Status   02/14/2022 70 55 - 135 U/L Final     AST   Date Value Ref Range Status   02/14/2022 20 10 - 40 U/L Final     ALT   Date Value Ref Range Status   02/14/2022 19 10 - 44 U/L Final     Anion Gap   Date Value Ref Range Status   02/14/2022 4 (L) 8 - 16 mmol/L Final     eGFR if    Date Value Ref Range Status   02/14/2022 >60 >60 mL/min/1.73 m^2 Final     eGFR if non    Date Value Ref Range Status   02/14/2022 >60 >60 mL/min/1.73 m^2 Final     Comment:     Calculation used to obtain the estimated glomerular filtration  rate (eGFR) is the CKD-EPI equation.        2/21/2022 COVID negative    Anesthesia Plan  Type of Anesthesia, risks & benefits discussed:    Anesthesia Type: Gen ETT  Intra-op Monitoring Plan: Standard ASA Monitors  Post Op Pain Control Plan: multimodal analgesia and IV/PO Opioids PRN  Induction:  IV  Informed Consent: Informed consent  signed with the Patient and all parties understand the risks and agree with anesthesia plan.  All questions answered.   ASA Score: 2  Day of Surgery Review of History & Physical: H&P Update referred to the surgeon/provider.    Ready For Surgery From Anesthesia Perspective.     .

## 2022-02-22 NOTE — ANESTHESIA POSTPROCEDURE EVALUATION
Anesthesia Post Evaluation    Patient: Ty Jorge    Procedure(s) Performed: Procedure(s) (LRB):  IRRIGATION AND DEBRIDEMENT (Left)    Final Anesthesia Type: general      Patient location during evaluation: PACU  Patient participation: Yes- Able to Participate  Level of consciousness: awake and alert  Post-procedure vital signs: reviewed and stable  Pain management: adequate  Airway patency: patent    PONV status at discharge: No PONV  Anesthetic complications: no      Cardiovascular status: hemodynamically stable  Respiratory status: unassisted and spontaneous ventilation  Hydration status: euvolemic  Follow-up not needed.          Vitals Value Taken Time   /75 02/22/22 1130   Temp 36.5 °C (97.7 °F) 02/22/22 1130   Pulse 69 02/22/22 1130   Resp 16 02/22/22 1130   SpO2 98 % 02/22/22 1130         Event Time   Out of Recovery 11:28:00         Pain/Avril Score: Pain Rating Prior to Med Admin: 5 (2/22/2022 10:40 AM)  Pain Rating Post Med Admin: 4 (2/22/2022 10:55 AM)  Avril Score: 10 (2/22/2022 11:30 AM)

## 2022-02-22 NOTE — DISCHARGE SUMMARY
[facility] - Surgery  Discharge Note  Short Stay    Procedure(s) (LRB):  REPAIR, HERNIA, INGUINAL, BILATERAL, LAPAROSCOPIC (Bilateral)    OUTCOME: Patient tolerated treatment/procedure well without complication and is now ready for discharge.    DISPOSITION: Home or Self Care    FINAL DIAGNOSIS: abscess  FOLLOWUP: In clinic    DISCHARGE INSTRUCTIONS:  No discharge procedures on file.     TIME SPENT ON DISCHARGE: 2 minutes

## 2022-02-23 LAB
FINAL PATHOLOGIC DIAGNOSIS: NORMAL
GROSS: NORMAL
Lab: NORMAL

## 2022-03-02 ENCOUNTER — TELEPHONE (OUTPATIENT)
Dept: SURGERY | Facility: CLINIC | Age: 42
End: 2022-03-02
Payer: MEDICAID

## 2022-03-02 NOTE — TELEPHONE ENCOUNTER
Attempted to reach pt, unable to reach, VM not set up    ----- Message from Idny Landrum sent at 3/2/2022 11:58 AM CST -----  Type: Patient Call Back    Who called: self     What is the request in detail: Patient complaining of his wound hurting. Wanted to know if he can get a few more pills of his oxyCODONE-acetaminophen (PERCOCET) 5-325 mg per tablet until his appointment on 3/9.    [id number] - [place] [address] [place] [facility] [address] [place] [address]  Phone: [phone] Fax: [phone]    Can the clinic reply by [facility]? No     Would the patient rather a call back or a response via [facility]? Call back     Best call back number: [phone]

## 2022-03-09 ENCOUNTER — OFFICE VISIT (OUTPATIENT)
Dept: SURGERY | Facility: CLINIC | Age: 42
End: 2022-03-09
Payer: COMMERCIAL

## 2022-03-09 VITALS
HEIGHT: 71 IN | DIASTOLIC BLOOD PRESSURE: 106 MMHG | WEIGHT: 196.44 LBS | SYSTOLIC BLOOD PRESSURE: 155 MMHG | HEART RATE: 118 BPM | BODY MASS INDEX: 27.5 KG/M2

## 2022-03-09 DIAGNOSIS — L02.31 LEFT BUTTOCK ABSCESS: Primary | ICD-10-CM

## 2022-03-09 PROCEDURE — 99024 POSTOP FOLLOW-UP VISIT: CPT | Mod: S$GLB,,, | Performed by: SURGERY

## 2022-03-09 PROCEDURE — 99024 PR POST-OP FOLLOW-UP VISIT: ICD-10-PCS | Mod: S$GLB,,, | Performed by: SURGERY

## 2022-03-09 PROCEDURE — 99999 PR PBB SHADOW E&M-EST. PATIENT-LVL III: CPT | Mod: PBBFAC,,, | Performed by: SURGERY

## 2022-03-09 PROCEDURE — 99999 PR PBB SHADOW E&M-EST. PATIENT-LVL III: ICD-10-PCS | Mod: PBBFAC,,, | Performed by: SURGERY

## 2022-03-09 RX ORDER — OXYCODONE AND ACETAMINOPHEN 5; 325 MG/1; MG/1
1 TABLET ORAL EVERY 4 HOURS PRN
Qty: 30 TABLET | Refills: 0 | Status: SHIPPED | OUTPATIENT
Start: 2022-03-09 | End: 2022-04-05

## 2022-03-09 NOTE — PROGRESS NOTES
42 y/o man with hisotry of inguianl hernia and was scheduled for repair, however he was found to have an infected cyst on buttocks at time of surgeyr.  This cyst was excised and he his here for f/e without c/o at this time    Wound healign well, granulation tissue present    Plan: wound check in 2 weeks will possibly schedule hernia repair at that visit

## 2022-03-23 ENCOUNTER — OFFICE VISIT (OUTPATIENT)
Dept: SURGERY | Facility: CLINIC | Age: 42
End: 2022-03-23
Payer: MEDICAID

## 2022-03-23 VITALS
WEIGHT: 196.44 LBS | DIASTOLIC BLOOD PRESSURE: 102 MMHG | BODY MASS INDEX: 27.5 KG/M2 | HEART RATE: 102 BPM | SYSTOLIC BLOOD PRESSURE: 144 MMHG | HEIGHT: 71 IN

## 2022-03-23 DIAGNOSIS — K40.20 BILATERAL INGUINAL HERNIA WITHOUT OBSTRUCTION OR GANGRENE, RECURRENCE NOT SPECIFIED: Primary | ICD-10-CM

## 2022-03-23 DIAGNOSIS — K40.20 INGUINAL HERNIA BILATERAL, NON-RECURRENT: ICD-10-CM

## 2022-03-23 PROCEDURE — 99214 OFFICE O/P EST MOD 30 MIN: CPT | Mod: PBBFAC | Performed by: SURGERY

## 2022-03-23 PROCEDURE — 99214 PR OFFICE/OUTPT VISIT, EST, LEVL IV, 30-39 MIN: ICD-10-PCS | Mod: S$PBB,,, | Performed by: SURGERY

## 2022-03-23 PROCEDURE — 99214 OFFICE O/P EST MOD 30 MIN: CPT | Mod: S$PBB,,, | Performed by: SURGERY

## 2022-03-23 PROCEDURE — 99999 PR PBB SHADOW E&M-EST. PATIENT-LVL IV: ICD-10-PCS | Mod: PBBFAC,,, | Performed by: SURGERY

## 2022-03-23 PROCEDURE — 99999 PR PBB SHADOW E&M-EST. PATIENT-LVL IV: CPT | Mod: PBBFAC,,, | Performed by: SURGERY

## 2022-03-23 RX ORDER — LIDOCAINE HYDROCHLORIDE 10 MG/ML
1 INJECTION, SOLUTION EPIDURAL; INFILTRATION; INTRACAUDAL; PERINEURAL ONCE
Status: CANCELLED | OUTPATIENT
Start: 2022-03-23 | End: 2022-03-23

## 2022-03-23 RX ORDER — SODIUM CHLORIDE 9 MG/ML
INJECTION, SOLUTION INTRAVENOUS CONTINUOUS
Status: CANCELLED | OUTPATIENT
Start: 2022-03-23

## 2022-03-23 NOTE — H&P (VIEW-ONLY)
History and Physical Exam     Patient ID: Ty Jorge is a 41 y.o. male.     Chief Complaint: Hernia (Inguinal )        HPI:  42 y/o man with history of few years of inguinal buldge, now with buldges at bilateral inguinal areas, and umbilical area, was scheduled for repair, but had an infected cyst at the time of last surgery.  He is now healed and ready to proceed with the planned surgery.        Review of Systems   Constitutional: Negative for chills and unexpected weight change.   HENT: Negative for congestion, ear pain and sore throat.    Eyes: Negative for pain and redness.   Respiratory: Negative for cough and shortness of breath.    Cardiovascular: Negative for chest pain and palpitations.   Gastrointestinal: Negative for abdominal distention, abdominal pain and constipation.   Endocrine: Negative for cold intolerance and heat intolerance.   Genitourinary: Negative for dysuria and frequency.   Musculoskeletal: Negative for back pain and neck pain.   Skin: Negative for pallor and rash.   Neurological: Negative for syncope, light-headedness and headaches.   Hematological: Negative for adenopathy. Does not bruise/bleed easily.   Psychiatric/Behavioral: Negative for confusion and hallucinations.                Current Outpatient Medications   Medication Sig Dispense Refill    amLODIPine (NORVASC) 10 MG tablet Take 1 tablet (10 mg total) by mouth once daily. 30 tablet 0    amoxicillin (AMOXIL) 500 MG capsule TK ONE C PO TID   0    cyclobenzaprine (FLEXERIL) 5 MG tablet TK 1 T PO TID PRF MUSCLE SPASM   1    docusate sodium (COLACE) 100 MG capsule Take 1 capsule (100 mg total) by mouth 2 (two) times daily as needed for Constipation. 30 capsule 0    mupirocin (BACTROBAN) 2 % ointment ARJUN TOPICALLY AA TID FOR 7 DAYS   0    naproxen (NAPROSYN) 500 MG tablet Take 1 tablet (500 mg total) by mouth 2 (two) times daily with meals. 60 tablet 0    omeprazole (PRILOSEC) 20 MG capsule TK 2 CS PO 30 MINUTES BEFORE  MONRNING MEAL   3      No current facility-administered medications for this visit.         Review of patient's allergies indicates:  No Known Allergies          Past Medical History:   Diagnosis Date    Hypertension      Recurrent boils                 Past Surgical History:   Procedure Laterality Date    CYST REMOVAL                   Family History   Problem Relation Age of Onset    Liver cancer Mother           Social History            Socioeconomic History    Marital status:    Tobacco Use    Smoking status: Current Every Day Smoker       Packs/day: 0.50       Types: Cigarettes    Smokeless tobacco: Never Used    Tobacco comment: pack can last up to a week   Substance and Sexual Activity    Alcohol use: Yes       Comment: socially    Drug use: No             Vitals:     02/16/22 1119   BP: (!) 165/107   Pulse: 96         Physical Exam  Constitutional:       Appearance: He is well-developed and well-nourished.   HENT:      Head: Normocephalic and atraumatic.   Eyes:      Extraocular Movements: EOM normal.      Pupils: Pupils are equal, round, and reactive to light.   Cardiovascular:      Rate and Rhythm: Normal rate and regular rhythm.      Heart sounds: No murmur heard.       Pulmonary:      Effort: Pulmonary effort is normal.      Breath sounds: Normal breath sounds. No wheezing.   Abdominal:      General: There is no distension.      Palpations: Abdomen is soft.      Tenderness: There is no abdominal tenderness.      Hernia: A hernia (hernias present at bilateral inguinal areas and umbilacl area, reducible and non tender) is present.   Musculoskeletal:         General: Normal range of motion.      Cervical back: Normal range of motion and neck supple.   Skin:     General: Skin is warm and dry.      Capillary Refill: Capillary refill takes less than 2 seconds.      Findings: No rash.   Neurological:      Mental Status: He is alert and oriented to person, place, and time.   Psychiatric:          Mood and Affect: Mood and affect normal.         Judgment: Judgment normal.            Assessment & Plan:    bilateral inguinal hernias, and umbilical hernia, plan lap repair of all hernias  Risks, benefits, alternatives to the procedure were discussed with the patient, who appears to understand and agree with the treatment plan.

## 2022-04-04 ENCOUNTER — ANESTHESIA EVENT (OUTPATIENT)
Dept: SURGERY | Facility: HOSPITAL | Age: 42
End: 2022-04-04
Payer: MEDICAID

## 2022-04-05 ENCOUNTER — HOSPITAL ENCOUNTER (OUTPATIENT)
Dept: PREADMISSION TESTING | Facility: HOSPITAL | Age: 42
Discharge: HOME | End: 2022-04-05
Attending: SURGERY
Payer: MEDICAID

## 2022-04-05 VITALS
BODY MASS INDEX: 28.49 KG/M2 | WEIGHT: 203.5 LBS | SYSTOLIC BLOOD PRESSURE: 154 MMHG | DIASTOLIC BLOOD PRESSURE: 99 MMHG | HEART RATE: 92 BPM | OXYGEN SATURATION: 100 % | TEMPERATURE: 99 F | RESPIRATION RATE: 18 BRPM | HEIGHT: 71 IN

## 2022-04-05 DIAGNOSIS — Z01.818 PREOPERATIVE TESTING: Primary | ICD-10-CM

## 2022-04-05 DIAGNOSIS — Z11.52 ENCOUNTER FOR PREOPERATIVE SCREENING LABORATORY TESTING FOR COVID-19 VIRUS: ICD-10-CM

## 2022-04-05 DIAGNOSIS — Z01.812 ENCOUNTER FOR PREOPERATIVE SCREENING LABORATORY TESTING FOR COVID-19 VIRUS: ICD-10-CM

## 2022-04-05 LAB
ALBUMIN SERPL BCP-MCNC: 4 G/DL (ref 3.5–5.2)
ALP SERPL-CCNC: 85 U/L (ref 55–135)
ALT SERPL W/O P-5'-P-CCNC: 18 U/L (ref 10–44)
ANION GAP SERPL CALC-SCNC: 7 MMOL/L (ref 8–16)
AST SERPL-CCNC: 24 U/L (ref 10–40)
BASOPHILS # BLD AUTO: 0.03 K/UL (ref 0–0.2)
BASOPHILS NFR BLD: 0.7 % (ref 0–1.9)
BILIRUB SERPL-MCNC: 0.4 MG/DL (ref 0.1–1)
BUN SERPL-MCNC: 12 MG/DL (ref 6–20)
CALCIUM SERPL-MCNC: 9 MG/DL (ref 8.7–10.5)
CHLORIDE SERPL-SCNC: 109 MMOL/L (ref 95–110)
CO2 SERPL-SCNC: 21 MMOL/L (ref 23–29)
CREAT SERPL-MCNC: 1.2 MG/DL (ref 0.5–1.4)
DIFFERENTIAL METHOD: ABNORMAL
EOSINOPHIL # BLD AUTO: 0.4 K/UL (ref 0–0.5)
EOSINOPHIL NFR BLD: 9.3 % (ref 0–8)
ERYTHROCYTE [DISTWIDTH] IN BLOOD BY AUTOMATED COUNT: 12.6 % (ref 11.5–14.5)
EST. GFR  (AFRICAN AMERICAN): >60 ML/MIN/1.73 M^2
EST. GFR  (NON AFRICAN AMERICAN): >60 ML/MIN/1.73 M^2
GLUCOSE SERPL-MCNC: 91 MG/DL (ref 70–110)
HCT VFR BLD AUTO: 41.3 % (ref 40–54)
HGB BLD-MCNC: 14.2 G/DL (ref 14–18)
IMM GRANULOCYTES # BLD AUTO: 0.01 K/UL (ref 0–0.04)
IMM GRANULOCYTES NFR BLD AUTO: 0.2 % (ref 0–0.5)
LYMPHOCYTES # BLD AUTO: 2.2 K/UL (ref 1–4.8)
LYMPHOCYTES NFR BLD: 50.7 % (ref 18–48)
MCH RBC QN AUTO: 31.2 PG (ref 27–31)
MCHC RBC AUTO-ENTMCNC: 34.4 G/DL (ref 32–36)
MCV RBC AUTO: 91 FL (ref 82–98)
MONOCYTES # BLD AUTO: 0.4 K/UL (ref 0.3–1)
MONOCYTES NFR BLD: 8.1 % (ref 4–15)
NEUTROPHILS # BLD AUTO: 1.4 K/UL (ref 1.8–7.7)
NEUTROPHILS NFR BLD: 31 % (ref 38–73)
NRBC BLD-RTO: 0 /100 WBC
PLATELET # BLD AUTO: 200 K/UL (ref 150–450)
PMV BLD AUTO: 9.5 FL (ref 9.2–12.9)
POTASSIUM SERPL-SCNC: 3.5 MMOL/L (ref 3.5–5.1)
PROT SERPL-MCNC: 7.1 G/DL (ref 6–8.4)
RBC # BLD AUTO: 4.55 M/UL (ref 4.6–6.2)
SARS-COV-2 RDRP RESP QL NAA+PROBE: NEGATIVE
SODIUM SERPL-SCNC: 137 MMOL/L (ref 136–145)
WBC # BLD AUTO: 4.42 K/UL (ref 3.9–12.7)

## 2022-04-05 PROCEDURE — 85025 COMPLETE CBC W/AUTO DIFF WBC: CPT | Performed by: SURGERY

## 2022-04-05 PROCEDURE — 36415 COLL VENOUS BLD VENIPUNCTURE: CPT | Performed by: SURGERY

## 2022-04-05 PROCEDURE — 80053 COMPREHEN METABOLIC PANEL: CPT | Performed by: SURGERY

## 2022-04-05 PROCEDURE — U0002 COVID-19 LAB TEST NON-CDC: HCPCS | Performed by: SURGERY

## 2022-04-05 NOTE — DISCHARGE INSTRUCTIONS
Before 7 AM, enter through the Emergency Entrance..   After 7 AM enter through the Main Entrance.      Your procedure  is scheduled for _WED 4/6_________.    Call [phone] between 2pm and 5pm on __TODAY _____to find out your arrival time for the day of surgery.    You may use the main entrance to the hospital on the [facility] side, or the entrance that is next to the garage.    You may have two visitors.  Visiting hours for non-COVID-19 patients expanded to 24/7 (still restricted to one visitor)  Youth visitation changed from age 18 to age 12.      You will be going to the Same Day Surgery Unit on the 2nd floor of the hospital.    Important instructions:  Do not eat anything after midnight.  You may have plain water, non carbonated.  You may also have Gatorade or Powerade after midnight.    Stop all fluids 2 hours before your surgery.    It is okay to brush your teeth.  Do not have gum, candy or mints.    SEE MEDICATION SHEET.   TAKE MEDICATIONS AS DIRECTED WITH SIPS OF WATER.      STOP taking Aspirin, Ibuprofen,  Advil, Motrin, Mobic(meloxicam), Aleve (naproxen), Fish oil, and Vitamin E for at least 7 days before your surgery.     You may take Tylenol if needed which is not a blood thinner.    Please shower the night before and the morning of your surgery.      Follow any Prep Instructions given by your surgeon.      Use Hibiclens soap as instructed by your pre op nurse.   Please place clean linens on your bed the night before surgery. Please wear fresh clean clothing after each shower.    No shaving of procedural area at least 4-5 days before surgery due to increased risk of skin irritation and/or possible infection.    Contact lenses and removable denture work may not be worn during your procedure.    You may wear deodorant only.     Do not wear powder, body lotion, perfume/cologne or make-up.    Do not wear any jewelry or have any metal on your body.    You will be asked to remove any dentures or  partials for the procedure.    If you are going home on the same day of surgery, you must arrange for a family member or a friend to drive you home.  Public transportation is prohibited.  You will not be able to drive home if you were given anesthesia or sedation.    Patients who want to have their Post-op prescriptions filled from our in-house [facility], bring a Credit/Debit Card  or cash with you. A co-pay may be required.  The pharmacy closes at 5:30 pm.      Wear loose fitting clothes allowing for bandages.    Please leave money and valuables home.      You may bring your cell phone.    Call the doctor if fever or illness should occur before your surgery.    Call [phone] to contact us here if needed.

## 2022-04-06 ENCOUNTER — HOSPITAL ENCOUNTER (OUTPATIENT)
Facility: HOSPITAL | Age: 42
Discharge: HOME | End: 2022-04-06
Attending: SURGERY | Admitting: SURGERY
Payer: MEDICAID

## 2022-04-06 ENCOUNTER — ANESTHESIA (OUTPATIENT)
Dept: SURGERY | Facility: HOSPITAL | Age: 42
End: 2022-04-06
Payer: MEDICAID

## 2022-04-06 VITALS
OXYGEN SATURATION: 100 % | DIASTOLIC BLOOD PRESSURE: 94 MMHG | SYSTOLIC BLOOD PRESSURE: 166 MMHG | RESPIRATION RATE: 18 BRPM | HEART RATE: 80 BPM | BODY MASS INDEX: 28.37 KG/M2 | TEMPERATURE: 98 F | WEIGHT: 203.44 LBS

## 2022-04-06 DIAGNOSIS — Z01.812 ENCOUNTER FOR PREOPERATIVE SCREENING LABORATORY TESTING FOR COVID-19 VIRUS: ICD-10-CM

## 2022-04-06 DIAGNOSIS — K40.20 BILATERAL INGUINAL HERNIA WITHOUT OBSTRUCTION OR GANGRENE, RECURRENCE NOT SPECIFIED: Primary | ICD-10-CM

## 2022-04-06 DIAGNOSIS — K40.20 INGUINAL HERNIA BILATERAL, NON-RECURRENT: ICD-10-CM

## 2022-04-06 DIAGNOSIS — Z11.52 ENCOUNTER FOR PREOPERATIVE SCREENING LABORATORY TESTING FOR COVID-19 VIRUS: ICD-10-CM

## 2022-04-06 PROCEDURE — 00840 ANES IPER PX LOWER ABD NOS: CPT | Performed by: SURGERY

## 2022-04-06 PROCEDURE — 63600175 PHARM REV CODE 636 W HCPCS: Performed by: STUDENT IN AN ORGANIZED HEALTH CARE EDUCATION/TRAINING PROGRAM

## 2022-04-06 PROCEDURE — 49650 PR LAP,INGUINAL HERNIA REPR,INITIAL: ICD-10-PCS | Mod: 50,,, | Performed by: SURGERY

## 2022-04-06 PROCEDURE — 71000016 HC POSTOP RECOV ADDL HR: Performed by: SURGERY

## 2022-04-06 PROCEDURE — 37000008 HC ANESTHESIA 1ST 15 MINUTES: Performed by: SURGERY

## 2022-04-06 PROCEDURE — 63600175 PHARM REV CODE 636 W HCPCS: Performed by: ANESTHESIOLOGY

## 2022-04-06 PROCEDURE — 63600175 PHARM REV CODE 636 W HCPCS: Performed by: SURGERY

## 2022-04-06 PROCEDURE — 25000003 PHARM REV CODE 250: Performed by: SURGERY

## 2022-04-06 PROCEDURE — C1781 MESH (IMPLANTABLE): HCPCS | Performed by: SURGERY

## 2022-04-06 PROCEDURE — 25000003 PHARM REV CODE 250: Performed by: STUDENT IN AN ORGANIZED HEALTH CARE EDUCATION/TRAINING PROGRAM

## 2022-04-06 PROCEDURE — D9220A PRA ANESTHESIA: Mod: CRNA,,, | Performed by: STUDENT IN AN ORGANIZED HEALTH CARE EDUCATION/TRAINING PROGRAM

## 2022-04-06 PROCEDURE — D9220A PRA ANESTHESIA: ICD-10-PCS | Mod: CRNA,,, | Performed by: STUDENT IN AN ORGANIZED HEALTH CARE EDUCATION/TRAINING PROGRAM

## 2022-04-06 PROCEDURE — 71000015 HC POSTOP RECOV 1ST HR: Performed by: SURGERY

## 2022-04-06 PROCEDURE — 36000709 HC OR TIME LEV III EA ADD 15 MIN: Performed by: SURGERY

## 2022-04-06 PROCEDURE — 71000033 HC RECOVERY, INTIAL HOUR: Performed by: SURGERY

## 2022-04-06 PROCEDURE — 37000009 HC ANESTHESIA EA ADD 15 MINS: Performed by: SURGERY

## 2022-04-06 PROCEDURE — 25000003 PHARM REV CODE 250: Performed by: ANESTHESIOLOGY

## 2022-04-06 PROCEDURE — 49650 LAP ING HERNIA REPAIR INIT: CPT | Mod: 50,,, | Performed by: SURGERY

## 2022-04-06 PROCEDURE — 27201423 OPTIME MED/SURG SUP & DEVICES STERILE SUPPLY: Performed by: SURGERY

## 2022-04-06 PROCEDURE — D9220A PRA ANESTHESIA: ICD-10-PCS | Mod: ANES,,, | Performed by: ANESTHESIOLOGY

## 2022-04-06 PROCEDURE — 71000039 HC RECOVERY, EACH ADD'L HOUR: Performed by: SURGERY

## 2022-04-06 PROCEDURE — D9220A PRA ANESTHESIA: Mod: ANES,,, | Performed by: ANESTHESIOLOGY

## 2022-04-06 PROCEDURE — 36000708 HC OR TIME LEV III 1ST 15 MIN: Performed by: SURGERY

## 2022-04-06 PROCEDURE — 82962 GLUCOSE BLOOD TEST: CPT | Performed by: SURGERY

## 2022-04-06 DEVICE — MESH POLY KNIT PERFIX PLG MED: Type: IMPLANTABLE DEVICE | Site: INGUINAL | Status: FUNCTIONAL

## 2022-04-06 DEVICE — MESH POLY KNIT PERFIX PLG LG+: Type: IMPLANTABLE DEVICE | Site: INGUINAL | Status: FUNCTIONAL

## 2022-04-06 DEVICE — MESH PATCH HERNIA VENTRALEX: Type: IMPLANTABLE DEVICE | Site: INGUINAL | Status: FUNCTIONAL

## 2022-04-06 RX ORDER — OXYCODONE AND ACETAMINOPHEN 5; 325 MG/1; MG/1
1 TABLET ORAL ONCE
Status: CANCELLED | OUTPATIENT
Start: 2022-04-06

## 2022-04-06 RX ORDER — SODIUM CHLORIDE, SODIUM LACTATE, POTASSIUM CHLORIDE, CALCIUM CHLORIDE 600; 310; 30; 20 MG/100ML; MG/100ML; MG/100ML; MG/100ML
INJECTION, SOLUTION INTRAVENOUS CONTINUOUS
Status: DISCONTINUED | OUTPATIENT
Start: 2022-04-06 | End: 2022-04-06 | Stop reason: HOSPADM

## 2022-04-06 RX ORDER — CEFAZOLIN SODIUM 2 G/50ML
2 SOLUTION INTRAVENOUS
Status: COMPLETED | OUTPATIENT
Start: 2022-04-06 | End: 2022-04-06

## 2022-04-06 RX ORDER — ROCURONIUM BROMIDE 10 MG/ML
INJECTION, SOLUTION INTRAVENOUS
Status: DISCONTINUED | OUTPATIENT
Start: 2022-04-06 | End: 2022-04-06

## 2022-04-06 RX ORDER — ONDANSETRON 2 MG/ML
INJECTION INTRAMUSCULAR; INTRAVENOUS
Status: DISCONTINUED | OUTPATIENT
Start: 2022-04-06 | End: 2022-04-06

## 2022-04-06 RX ORDER — KETOROLAC TROMETHAMINE 30 MG/ML
15 INJECTION, SOLUTION INTRAMUSCULAR; INTRAVENOUS EVERY 8 HOURS PRN
Status: DISCONTINUED | OUTPATIENT
Start: 2022-04-06 | End: 2022-04-06 | Stop reason: HOSPADM

## 2022-04-06 RX ORDER — LIDOCAINE HYDROCHLORIDE 20 MG/ML
INJECTION INTRAVENOUS
Status: DISCONTINUED | OUTPATIENT
Start: 2022-04-06 | End: 2022-04-06

## 2022-04-06 RX ORDER — ACETAMINOPHEN 10 MG/ML
1000 INJECTION, SOLUTION INTRAVENOUS ONCE
Status: DISCONTINUED | OUTPATIENT
Start: 2022-04-06 | End: 2022-04-06

## 2022-04-06 RX ORDER — HYDRALAZINE HYDROCHLORIDE 20 MG/ML
10 INJECTION INTRAMUSCULAR; INTRAVENOUS ONCE
Status: COMPLETED | OUTPATIENT
Start: 2022-04-06 | End: 2022-04-06

## 2022-04-06 RX ORDER — BUPIVACAINE HYDROCHLORIDE AND EPINEPHRINE 2.5; 5 MG/ML; UG/ML
INJECTION, SOLUTION EPIDURAL; INFILTRATION; INTRACAUDAL; PERINEURAL
Status: DISCONTINUED | OUTPATIENT
Start: 2022-04-06 | End: 2022-04-06 | Stop reason: HOSPADM

## 2022-04-06 RX ORDER — OXYCODONE HYDROCHLORIDE 5 MG/1
10 TABLET ORAL ONCE
Status: COMPLETED | OUTPATIENT
Start: 2022-04-06 | End: 2022-04-06

## 2022-04-06 RX ORDER — LIDOCAINE HYDROCHLORIDE 10 MG/ML
1 INJECTION, SOLUTION EPIDURAL; INFILTRATION; INTRACAUDAL; PERINEURAL ONCE
Status: DISCONTINUED | OUTPATIENT
Start: 2022-04-06 | End: 2022-04-06 | Stop reason: HOSPADM

## 2022-04-06 RX ORDER — ONDANSETRON 2 MG/ML
4 INJECTION INTRAMUSCULAR; INTRAVENOUS DAILY PRN
Status: DISCONTINUED | OUTPATIENT
Start: 2022-04-06 | End: 2022-04-06 | Stop reason: HOSPADM

## 2022-04-06 RX ORDER — SUCCINYLCHOLINE CHLORIDE 20 MG/ML
INJECTION INTRAMUSCULAR; INTRAVENOUS
Status: DISCONTINUED | OUTPATIENT
Start: 2022-04-06 | End: 2022-04-06

## 2022-04-06 RX ORDER — FENTANYL CITRATE 50 UG/ML
25 INJECTION, SOLUTION INTRAMUSCULAR; INTRAVENOUS EVERY 5 MIN PRN
Status: COMPLETED | OUTPATIENT
Start: 2022-04-06 | End: 2022-04-06

## 2022-04-06 RX ORDER — SODIUM CHLORIDE 0.9 % (FLUSH) 0.9 %
10 SYRINGE (ML) INJECTION
Status: DISCONTINUED | OUTPATIENT
Start: 2022-04-06 | End: 2022-04-06 | Stop reason: HOSPADM

## 2022-04-06 RX ORDER — DEXAMETHASONE SODIUM PHOSPHATE 4 MG/ML
INJECTION, SOLUTION INTRA-ARTICULAR; INTRALESIONAL; INTRAMUSCULAR; INTRAVENOUS; SOFT TISSUE
Status: DISCONTINUED | OUTPATIENT
Start: 2022-04-06 | End: 2022-04-06

## 2022-04-06 RX ORDER — ACETAMINOPHEN 10 MG/ML
1000 INJECTION, SOLUTION INTRAVENOUS ONCE
Status: COMPLETED | OUTPATIENT
Start: 2022-04-06 | End: 2022-04-06

## 2022-04-06 RX ORDER — OXYCODONE AND ACETAMINOPHEN 5; 325 MG/1; MG/1
1 TABLET ORAL EVERY 4 HOURS PRN
Qty: 30 TABLET | Refills: 0 | Status: SHIPPED | OUTPATIENT
Start: 2022-04-06 | End: 2022-05-06

## 2022-04-06 RX ORDER — LABETALOL HYDROCHLORIDE 5 MG/ML
INJECTION, SOLUTION INTRAVENOUS
Status: DISCONTINUED | OUTPATIENT
Start: 2022-04-06 | End: 2022-04-06

## 2022-04-06 RX ORDER — ACETAMINOPHEN 500 MG
1000 TABLET ORAL
Status: COMPLETED | OUTPATIENT
Start: 2022-04-06 | End: 2022-04-06

## 2022-04-06 RX ORDER — PROPOFOL 10 MG/ML
VIAL (ML) INTRAVENOUS
Status: DISCONTINUED | OUTPATIENT
Start: 2022-04-06 | End: 2022-04-06

## 2022-04-06 RX ORDER — FENTANYL CITRATE 50 UG/ML
INJECTION, SOLUTION INTRAMUSCULAR; INTRAVENOUS
Status: DISCONTINUED | OUTPATIENT
Start: 2022-04-06 | End: 2022-04-06

## 2022-04-06 RX ORDER — SODIUM CHLORIDE 9 MG/ML
INJECTION, SOLUTION INTRAVENOUS CONTINUOUS
Status: DISCONTINUED | OUTPATIENT
Start: 2022-04-06 | End: 2022-04-06 | Stop reason: HOSPADM

## 2022-04-06 RX ORDER — HYDROMORPHONE HYDROCHLORIDE 2 MG/ML
0.2 INJECTION, SOLUTION INTRAMUSCULAR; INTRAVENOUS; SUBCUTANEOUS EVERY 5 MIN PRN
Status: COMPLETED | OUTPATIENT
Start: 2022-04-06 | End: 2022-04-06

## 2022-04-06 RX ORDER — FENTANYL CITRATE 50 UG/ML
25 INJECTION, SOLUTION INTRAMUSCULAR; INTRAVENOUS EVERY 5 MIN PRN
Status: DISCONTINUED | OUTPATIENT
Start: 2022-04-06 | End: 2022-04-06 | Stop reason: HOSPADM

## 2022-04-06 RX ORDER — MIDAZOLAM HYDROCHLORIDE 1 MG/ML
INJECTION INTRAMUSCULAR; INTRAVENOUS
Status: DISCONTINUED | OUTPATIENT
Start: 2022-04-06 | End: 2022-04-06

## 2022-04-06 RX ADMIN — ROCURONIUM BROMIDE 5 MG: 10 INJECTION, SOLUTION INTRAVENOUS at 01:04

## 2022-04-06 RX ADMIN — FENTANYL CITRATE 25 MCG: 50 INJECTION, SOLUTION INTRAMUSCULAR; INTRAVENOUS at 04:04

## 2022-04-06 RX ADMIN — KETOROLAC TROMETHAMINE 15 MG: 30 INJECTION, SOLUTION INTRAMUSCULAR at 05:04

## 2022-04-06 RX ADMIN — HYDROMORPHONE HYDROCHLORIDE 0.2 MG: 2 INJECTION INTRAMUSCULAR; INTRAVENOUS; SUBCUTANEOUS at 03:04

## 2022-04-06 RX ADMIN — DEXAMETHASONE SODIUM PHOSPHATE 4 MG: 4 INJECTION, SOLUTION INTRAMUSCULAR; INTRAVENOUS at 02:04

## 2022-04-06 RX ADMIN — HYDRALAZINE HYDROCHLORIDE 10 MG: 20 INJECTION, SOLUTION INTRAMUSCULAR; INTRAVENOUS at 05:04

## 2022-04-06 RX ADMIN — ONDANSETRON 4 MG: 2 INJECTION, SOLUTION INTRAMUSCULAR; INTRAVENOUS at 01:04

## 2022-04-06 RX ADMIN — FENTANYL CITRATE 25 MCG: 50 INJECTION, SOLUTION INTRAMUSCULAR; INTRAVENOUS at 02:04

## 2022-04-06 RX ADMIN — CEFAZOLIN SODIUM 1 G: 2 SOLUTION INTRAVENOUS at 02:04

## 2022-04-06 RX ADMIN — HYDROMORPHONE HYDROCHLORIDE 0.2 MG: 2 INJECTION INTRAMUSCULAR; INTRAVENOUS; SUBCUTANEOUS at 04:04

## 2022-04-06 RX ADMIN — OXYCODONE 10 MG: 5 TABLET ORAL at 06:04

## 2022-04-06 RX ADMIN — ROCURONIUM BROMIDE 25 MG: 10 INJECTION, SOLUTION INTRAVENOUS at 01:04

## 2022-04-06 RX ADMIN — FENTANYL CITRATE 100 MCG: 50 INJECTION, SOLUTION INTRAMUSCULAR; INTRAVENOUS at 01:04

## 2022-04-06 RX ADMIN — PROPOFOL 200 MG: 10 INJECTION, EMULSION INTRAVENOUS at 01:04

## 2022-04-06 RX ADMIN — PROPOFOL 30 MG: 10 INJECTION, EMULSION INTRAVENOUS at 02:04

## 2022-04-06 RX ADMIN — CEFAZOLIN SODIUM 1 G: 2 SOLUTION INTRAVENOUS at 01:04

## 2022-04-06 RX ADMIN — SODIUM CHLORIDE, SODIUM LACTATE, POTASSIUM CHLORIDE, AND CALCIUM CHLORIDE: .6; .31; .03; .02 INJECTION, SOLUTION INTRAVENOUS at 12:04

## 2022-04-06 RX ADMIN — LABETALOL HYDROCHLORIDE 10 MG: 5 INJECTION, SOLUTION INTRAVENOUS at 02:04

## 2022-04-06 RX ADMIN — MIDAZOLAM HYDROCHLORIDE 2 MG: 1 INJECTION, SOLUTION INTRAMUSCULAR; INTRAVENOUS at 01:04

## 2022-04-06 RX ADMIN — SODIUM CHLORIDE, SODIUM LACTATE, POTASSIUM CHLORIDE, AND CALCIUM CHLORIDE: .6; .31; .03; .02 INJECTION, SOLUTION INTRAVENOUS at 02:04

## 2022-04-06 RX ADMIN — FENTANYL CITRATE 50 MCG: 50 INJECTION, SOLUTION INTRAMUSCULAR; INTRAVENOUS at 02:04

## 2022-04-06 RX ADMIN — HYDRALAZINE HYDROCHLORIDE 10 MG: 20 INJECTION, SOLUTION INTRAMUSCULAR; INTRAVENOUS at 04:04

## 2022-04-06 RX ADMIN — SUGAMMADEX 200 MG: 100 INJECTION, SOLUTION INTRAVENOUS at 02:04

## 2022-04-06 RX ADMIN — ROCURONIUM BROMIDE 20 MG: 10 INJECTION, SOLUTION INTRAVENOUS at 02:04

## 2022-04-06 RX ADMIN — KETOROLAC TROMETHAMINE 15 MG: 30 INJECTION, SOLUTION INTRAMUSCULAR at 04:04

## 2022-04-06 RX ADMIN — PROPOFOL 30 MG: 10 INJECTION, EMULSION INTRAVENOUS at 01:04

## 2022-04-06 RX ADMIN — PROPOFOL 40 MG: 10 INJECTION, EMULSION INTRAVENOUS at 02:04

## 2022-04-06 RX ADMIN — LIDOCAINE HYDROCHLORIDE 80 MG: 20 INJECTION, SOLUTION INTRAVENOUS at 01:04

## 2022-04-06 RX ADMIN — SUCCINYLCHOLINE CHLORIDE 160 MG: 20 INJECTION, SOLUTION INTRAMUSCULAR; INTRAVENOUS at 01:04

## 2022-04-06 RX ADMIN — ACETAMINOPHEN 1000 MG: 500 TABLET ORAL at 12:04

## 2022-04-06 RX ADMIN — ACETAMINOPHEN 1000 MG: 10 INJECTION INTRAVENOUS at 05:04

## 2022-04-06 RX ADMIN — PROPOFOL 50 MG: 10 INJECTION, EMULSION INTRAVENOUS at 01:04

## 2022-04-06 NOTE — ANESTHESIA PREPROCEDURE EVALUATION
04/06/2022  Ty Jorge is a 41 y.o., male.      Pre-op Assessment    I have reviewed the Patient Summary Reports.     I have reviewed the Nursing Notes.       Review of Systems  Anesthesia Hx:  No problems with previous Anesthesia  Denies Family Hx of Anesthesia complications.   Denies Personal Hx of Anesthesia complications.   Social:  Smoker, Alcohol Use    Cardiovascular:   Exercise tolerance: good Hypertension Denies MI.   Denies Dysrhythmias.   Denies Angina.    Pulmonary:  Pulmonary Normal    Renal/:  Renal/ Normal     Hepatic/GI:  Hepatic/GI Normal    Musculoskeletal:   Bilateral inguinal hernias   Neurological:  Neurology Normal    Endocrine:  Endocrine Normal        Physical Exam  General: Well nourished, Cooperative and Alert    Airway:  Mallampati: II   Mouth Opening: Normal  TM Distance: 4 - 6 cm  Tongue: Normal  Neck ROM: Normal ROM    Dental:  Intact    Chest/Lungs:  Clear to auscultation, Normal Respiratory Rate    Heart:  Rate: Normal  Rhythm: Regular Rhythm      Wt Readings from Last 3 Encounters:   04/05/22 92.3 kg (203 lb 7 oz)   04/05/22 92.3 kg (203 lb 7.8 oz)   03/23/22 89.1 kg (196 lb 6.9 oz)     Temp Readings from Last 3 Encounters:   04/06/22 36.8 °C (98.2 °F) (Oral)   04/05/22 36.9 °C (98.5 °F) (Oral)   02/22/22 36.6 °C (97.8 °F) (Oral)     BP Readings from Last 3 Encounters:   04/06/22 (!) 138/90   04/05/22 (!) 154/99   03/23/22 (!) 144/102     Pulse Readings from Last 3 Encounters:   04/06/22 94   04/05/22 92   03/23/22 102     Lab Results   Component Value Date    WBC 4.42 04/05/2022    HGB 14.2 04/05/2022    HCT 41.3 04/05/2022    MCV 91 04/05/2022     04/05/2022       CMP  Sodium   Date Value Ref Range Status   04/05/2022 137 136 - 145 mmol/L Final     Potassium   Date Value Ref Range Status   04/05/2022 3.5 3.5 - 5.1 mmol/L Final     Chloride   Date Value Ref  Range Status   04/05/2022 109 95 - 110 mmol/L Final     CO2   Date Value Ref Range Status   04/05/2022 21 (L) 23 - 29 mmol/L Final     Glucose   Date Value Ref Range Status   04/05/2022 91 70 - 110 mg/dL Final     BUN   Date Value Ref Range Status   04/05/2022 12 6 - 20 mg/dL Final     Creatinine   Date Value Ref Range Status   04/05/2022 1.2 0.5 - 1.4 mg/dL Final     Calcium   Date Value Ref Range Status   04/05/2022 9.0 8.7 - 10.5 mg/dL Final     Total Protein   Date Value Ref Range Status   04/05/2022 7.1 6.0 - 8.4 g/dL Final     Albumin   Date Value Ref Range Status   04/05/2022 4.0 3.5 - 5.2 g/dL Final     Total Bilirubin   Date Value Ref Range Status   04/05/2022 0.4 0.1 - 1.0 mg/dL Final     Comment:     For infants and newborns, interpretation of results should be based  on gestational age, weight and in agreement with clinical  observations.    Premature Infant recommended reference ranges:  Up to 24 hours.............<8.0 mg/dL  Up to 48 hours............<12.0 mg/dL  3-5 days..................<15.0 mg/dL  6-29 days.................<15.0 mg/dL       Alkaline Phosphatase   Date Value Ref Range Status   04/05/2022 85 55 - 135 U/L Final     AST   Date Value Ref Range Status   04/05/2022 24 10 - 40 U/L Final     ALT   Date Value Ref Range Status   04/05/2022 18 10 - 44 U/L Final     Anion Gap   Date Value Ref Range Status   04/05/2022 7 (L) 8 - 16 mmol/L Final     eGFR if    Date Value Ref Range Status   04/05/2022 >60 >60 mL/min/1.73 m^2 Final     eGFR if non    Date Value Ref Range Status   04/05/2022 >60 >60 mL/min/1.73 m^2 Final     Comment:     Calculation used to obtain the estimated glomerular filtration  rate (eGFR) is the CKD-EPI equation.        4/5/2022 COVID negative    Anesthesia Plan  Type of Anesthesia, risks & benefits discussed:    Anesthesia Type: Gen ETT  Intra-op Monitoring Plan: Standard ASA Monitors  Post Op Pain Control Plan: multimodal analgesia and IV/PO  Opioids PRN  Induction:  IV  Informed Consent: Informed consent signed with the Patient and all parties understand the risks and agree with anesthesia plan.  All questions answered.   ASA Score: 2  Day of Surgery Review of History & Physical: H&P Update referred to the surgeon/provider.    Ready For Surgery From Anesthesia Perspective.     .

## 2022-04-06 NOTE — DISCHARGE INSTRUCTIONS
[facility]  Office # [phone]    Discharge Instructions for Same Day Surgery     Call the office for and appointment if one has not already been made.     Diet: Drink plenty of fluids the first 48 hours and you may resume your   usual diet.     Activity: No heavy lifting (over 10 pounds), pushing or pulling until your   post op visit. Your doctor's office may have told you to limit your lifting to less weight, or even no weight.  Be sure to follow those instructions.    Note: You may ride in a car and you may drive when comfortable.     Do not drive, drink alcohol, or sign legal documents for 24 hours, or if taking narcotic pain medication.    Your last narcotic pill was given at 6:30 pm     Dressings: Remove the dressing 24 hours after surgery. You may shower  24 hours after surgery and you may wash your hair.     If you have steri strips ( appears to be strips of white tape) on   your incision, leave them on. In 5-7 days they will begin to fall off.      Medical: Call the doctor for any of the following problems: fever above 101,   severe pain, bleeding, or abdominal distention (swelling).   If constipated you may take any stool softener you choose.     Occasionally small areas of skin numbness or an unpleasant skin sensation can result. Also, you may find that your incision is swollen and tender for a few days.  Some redness around sutures and staples is a normal reaction, but if the discomfort persists or worsens, call you doctor.             Fall Prevention  Millions of people fall every year and injure themselves. You may have had anesthesia or sedation which may increase your risk of falling. You may have health issues that put you at an increased risk of falling.     Here are ways to reduce your risk of falling.    Make your home safe by keeping walkways clear of objects you may trip over.  Use non-slip pads under rugs. Do not use area rugs or small throw rugs.  Use non-slip mats  in bathtubs and showers.  Install handrails and lights on staircases.  Do not walk in poorly lit areas.  Do not stand on chairs or wobbly ladders.  Use caution when reaching overhead or looking upward. This position can cause a loss of balance.  Be sure your shoes fit properly, have non-slip bottoms and are in good condition.   Wear shoes both inside and out. Avoid going barefoot or wearing slippers.  Be cautious when going up and down stairs, curbs, and when walking on uneven sidewalks.  If your balance is poor, consider using a cane or walker.  If your fall was related to alcohol use, stop or limit alcohol intake.   If your fall was related to use of sleeping medicines, talk to your doctor about this. You may need to reduce your dosage at bedtime if you awaken during the night to go to the bathroom.    To reduce the need for nighttime bathroom trips:  Avoid drinking fluids for several hours before going to bed  Empty your bladder before going to bed  Men can keep a urinal at the bedside  Stay as active as you can. Balance, flexibility, strength, and endurance all come from exercise. They all play a role in preventing falls. Ask your healthcare provider which types of activity are right for you.  Get your vision checked on a regular basis.  If you have pets, know where they are before you stand up or walk so you don't trip over them.  Use night lights.

## 2022-04-06 NOTE — TRANSFER OF CARE
Anesthesia Transfer of Care Note    Patient: Ty Jorge    Procedure(s) Performed: Procedure(s) (LRB):  REPAIR, HERNIA, INGUINAL, BILATERAL, LAPAROSCOPIC (N/A)    Patient location: PACU    Anesthesia Type: general    Transport from OR: Transported from OR on room air with adequate spontaneous ventilation    Post assessment: no apparent anesthetic complications    Post vital signs: stable    Level of consciousness: awake, alert and oriented    Nausea/Vomiting: no nausea/vomiting    Complications: none    Transfer of care protocol was followed      Last vitals:   Visit Vitals  BP (!) 156/110 (BP Location: Right arm, Patient Position: Lying)   Pulse 83   Temp 36.7 °C (98.1 °F) (Oral)   Resp 16   Wt 92.3 kg (203 lb 7 oz)   SpO2 97%   BMI 28.37 kg/m²

## 2022-04-06 NOTE — DISCHARGE SUMMARY
[facility] [place]  Discharge Note  Short Stay    Procedure(s) (LRB):  REPAIR, HERNIA, INGUINAL, BILATERAL, LAPAROSCOPIC (N/A)    OUTCOME: Patient tolerated treatment/procedure well without complication and is now ready for discharge.    DISPOSITION: Home or Self Care    FINAL DIAGNOSIS: bilateral inguinal hernias    FOLLOWUP: In clinic    DISCHARGE INSTRUCTIONS:    Discharge Procedure Orders   Lifting restrictions     Notify your health care provider if you experience any of the following:  temperature >100.4     Notify your health care provider if you experience any of the following:  persistent nausea and vomiting or diarrhea     Notify your health care provider if you experience any of the following:  severe uncontrolled pain     Notify your health care provider if you experience any of the following:  redness, tenderness, or signs of infection (pain, swelling, redness, odor or green/yellow discharge around incision site)     Remove dressing in 24 hours     Activity as tolerated        TIME SPENT ON DISCHARGE: 3 minutes

## 2022-04-06 NOTE — OP NOTE
.  [facility] - Surgery  General Surgery  Operative Note    SUMMARY     Date of Procedure: 4/6/2022     Procedure: Procedure(s) (LRB):  REPAIR, HERNIA, INGUINAL, BILATERAL, LAPAROSCOPIC (N/A)       Surgeon(s) and Role:     * Og Moreno MD - Primary    Assisting Surgeon: None    Pre-Operative Diagnosis: Bilateral inguinal hernia without obstruction or gangrene, recurrence not specified [K40.20]    Post-Operative Diagnosis: Post-Op Diagnosis Codes:     * Bilateral inguinal hernia without obstruction or gangrene, recurrence not specified [K40.20]    Anesthesia: Choice    Indications for Procedure: bilateral inguinal hernias    Procedure in Detail:  After informed consent was obtained, the patient in the operating room laid supine on the operating room table.  After anesthesia was administered, patient prepped and draped in standard surgical fashion.  A preprocedure pause was performed correctly identify the patient and the procedure to be performed.  Vertical incision was made above the patient's umbilicus carried down to the fascia.  The patient did have an umbilical hernia.  The hernia was entered.  And the peritoneum was punctured manually.  A 1 cm trocar was placed to the incision gas was insufflated to establish pneumoperitoneum.  A 30 degree 5 mm scope was then placed a trocar in the abdominal cavity was inspected.  There was no evidence of injury upon entry.  Additional trocars were placed in bilateral lower quadrants.  Attention was turned to the left inguinal area 1st a large indirect inguinal hernia was noted.  Mesh was fashion for both of the inguinal hernias using a plug and intra-abdominal patch method.  The plug was affixed to polypropylene mesh with an anti-adhesive barrier using interrupted 2-0 Prolene sutures.  The mesh was then placed in the intra-abdominal cavity and suture Passer used to pull the plug directly into the defect.  The patch was then tacked circumferentially being careful to avoid  the femoral arteries and vein as well as the inferior epigastric vessels.    This was repeated for the contralateral or right side, in identical fashion.    The trocars removed under direct visualization and the fascia the umbilicus was reapproximated using a 0 Vicryl figure-of-eight stitch.  Skin edges at all port sites were approximated using 4-0 Vicryl subcuticular stitches and sterile dressing was applied.      Significant Surgical Tasks Conducted by the Assistant(s), if Applicable:n/a    Estimated Blood Loss (EBL):  minimal           Implants:   Implant Name Type Inv. Item Serial No.  Lot No. LRB No. Used Action   MESH PATCH HERNIA VENTRALEX - [id number]  MESH PATCH HERNIA VENTRALEX  [employer] [id number] N/A 1 Implanted   MESH POLY KNIT PERFIX PLG LG+ - [id number]  MESH POLY KNIT PERFIX PLG LG+  [employer] [id number] N/A 1 Implanted   MESH PATCH HERNIA VENTRALEX - [id number]  MESH PATCH HERNIA VENTRALEX  [employer] [id number] N/A 1 Implanted   MESH POLY KNIT PERFIX PLG MED - [id number]  MESH POLY KNIT PERFIX PLG MED  [employer] [id number] N/A 1 Implanted       Specimens:   Specimen (24h ago, onward)            None                  Condition: Good    Disposition: PACU - hemodynamically stable.    Attestation: I was present and scrubbed for the entire procedure.

## 2022-04-07 LAB — POCT GLUCOSE: 97 MG/DL (ref 70–110)

## 2022-04-09 NOTE — ANESTHESIA POSTPROCEDURE EVALUATION
Anesthesia Post Evaluation    Patient: Ty Jorge    Procedure(s) Performed: Procedure(s) (LRB):  REPAIR, HERNIA, INGUINAL, BILATERAL, LAPAROSCOPIC (N/A)    Final Anesthesia Type: general      Patient location during evaluation: PACU  Patient participation: Yes- Able to Participate  Level of consciousness: awake and alert  Post-procedure vital signs: reviewed and stable  Pain management: adequate  Airway patency: patent    PONV status at discharge: No PONV  Anesthetic complications: no      Cardiovascular status: hypertensive and hemodynamically stable (Will treat BP if it remains elevated.)  Respiratory status: unassisted and spontaneous ventilation  Hydration status: euvolemic  Follow-up not needed.      T: 36.7  HR: 83  RR: 16  BP: 156/110  O2 sat: 97%

## 2022-04-13 ENCOUNTER — OFFICE VISIT (OUTPATIENT)
Dept: SURGERY | Facility: CLINIC | Age: 42
End: 2022-04-13
Payer: MEDICAID

## 2022-04-13 VITALS
DIASTOLIC BLOOD PRESSURE: 100 MMHG | SYSTOLIC BLOOD PRESSURE: 162 MMHG | HEART RATE: 93 BPM | WEIGHT: 203.5 LBS | BODY MASS INDEX: 28.49 KG/M2 | HEIGHT: 71 IN

## 2022-04-13 DIAGNOSIS — K40.20 BILATERAL INGUINAL HERNIA WITHOUT OBSTRUCTION OR GANGRENE, RECURRENCE NOT SPECIFIED: Primary | ICD-10-CM

## 2022-04-13 PROCEDURE — 3077F SYST BP >= 140 MM HG: CPT | Mod: CPTII,,, | Performed by: SURGERY

## 2022-04-13 PROCEDURE — 99024 POSTOP FOLLOW-UP VISIT: CPT | Mod: ,,, | Performed by: SURGERY

## 2022-04-13 PROCEDURE — 99999 PR PBB SHADOW E&M-EST. PATIENT-LVL III: ICD-10-PCS | Mod: PBBFAC,,, | Performed by: SURGERY

## 2022-04-13 PROCEDURE — 3077F PR MOST RECENT SYSTOLIC BLOOD PRESSURE >= 140 MM HG: ICD-10-PCS | Mod: CPTII,,, | Performed by: SURGERY

## 2022-04-13 PROCEDURE — 3008F BODY MASS INDEX DOCD: CPT | Mod: CPTII,,, | Performed by: SURGERY

## 2022-04-13 PROCEDURE — 99999 PR PBB SHADOW E&M-EST. PATIENT-LVL III: CPT | Mod: PBBFAC,,, | Performed by: SURGERY

## 2022-04-13 PROCEDURE — 1159F PR MEDICATION LIST DOCUMENTED IN MEDICAL RECORD: ICD-10-PCS | Mod: CPTII,,, | Performed by: SURGERY

## 2022-04-13 PROCEDURE — 99024 PR POST-OP FOLLOW-UP VISIT: ICD-10-PCS | Mod: ,,, | Performed by: SURGERY

## 2022-04-13 PROCEDURE — 3080F PR MOST RECENT DIASTOLIC BLOOD PRESSURE >= 90 MM HG: ICD-10-PCS | Mod: CPTII,,, | Performed by: SURGERY

## 2022-04-13 PROCEDURE — 3008F PR BODY MASS INDEX (BMI) DOCUMENTED: ICD-10-PCS | Mod: CPTII,,, | Performed by: SURGERY

## 2022-04-13 PROCEDURE — 99213 OFFICE O/P EST LOW 20 MIN: CPT | Mod: PBBFAC | Performed by: SURGERY

## 2022-04-13 PROCEDURE — 3080F DIAST BP >= 90 MM HG: CPT | Mod: CPTII,,, | Performed by: SURGERY

## 2022-04-13 PROCEDURE — 1159F MED LIST DOCD IN RCRD: CPT | Mod: CPTII,,, | Performed by: SURGERY

## 2022-04-13 NOTE — PROGRESS NOTES
41. y/o man with history of lap inguinal hernia, now about 2 weeks out.  No complaints this am, reports feeling well.    PE: incision c/d/i no evidence of infection or hernia    Impression: post op, doing well    Plan: gradually resume normal activity, normal diet, and follow up as needed.

## 2022-04-18 ENCOUNTER — TELEPHONE (OUTPATIENT)
Dept: SURGERY | Facility: CLINIC | Age: 42
End: 2022-04-18
Payer: MEDICAID

## 2022-04-18 NOTE — TELEPHONE ENCOUNTER
Spoke with patient, he was asking advise on how to get a PCP. I provided him with the number for [facility] and I also provided him with information on [facility]. Pt said he would try to follow up with these options.

## 2022-04-18 NOTE — TELEPHONE ENCOUNTER
----- Message from Apurva Anne sent at 4/18/2022  1:33 PM CDT -----  Regarding: Refill request  Type:  RX Refill Request    Who Called: KEVIN REAVES [[id number]]     Refill or New Rx: Refill     RX Name and Strength: amLODIPine (NORVASC) 10 MG tablet    How is the patient currently taking it? (ex. 1XDay) 1xday     Is this a 30 day or 90 day RX: 30 days     Preferred Pharmacy with phone number: [id number] - [place], [place] - [address] AT [facility]    Local or Mail Order: local     Ordering Provider: Dr. Chaudhary in ER     Best Call Back Number: [phone]    Additional Information: pt does not have a pcp and was prescribed the script  in ER. He has 2 days left

## 2024-01-16 NOTE — ANESTHESIA PROCEDURE NOTES
Intubation    Date/Time: 2/22/2022 9:14 AM  Performed by: Clay Palma CRNA  Authorized by: Donny Thompson MD     Intubation:     Induction:  Rapid sequence induction    Intubated:  Postinduction    Mask Ventilation:  N/a    Attempts:  1    Attempted By:  CRNA    Method of Intubation:  Video laryngoscopy    Blade:  Gustafson 3    Laryngeal View Grade: Grade I - full view of cords      Difficult Airway Encountered?: No      Complications:  None    Airway Device:  Oral endotracheal tube    Airway Device Size:  7.5    Style/Cuff Inflation:  Cuffed (inflated to minimal occlusive pressure)    Tube secured:  23    Secured at:  The lips    Placement Verified By:  Capnometry    Complicating Factors:  None    Findings Post-Intubation:  BS equal bilateral and atraumatic/condition of teeth unchanged     MD aware of pt. CBG of 98.

## 2024-08-05 ENCOUNTER — TELEPHONE (OUTPATIENT)
Dept: PRIMARY CARE CLINIC | Facility: CLINIC | Age: 44
End: 2024-08-05
Payer: MEDICAID

## 2024-08-06 ENCOUNTER — TELEPHONE (OUTPATIENT)
Dept: PRIMARY CARE CLINIC | Facility: CLINIC | Age: 44
End: 2024-08-06
Payer: MEDICAID

## 2024-08-20 ENCOUNTER — OFFICE VISIT (OUTPATIENT)
Dept: PRIMARY CARE CLINIC | Facility: CLINIC | Age: 44
End: 2024-08-20

## 2024-08-20 ENCOUNTER — LAB VISIT (OUTPATIENT)
Dept: LAB | Facility: HOSPITAL | Age: 44
End: 2024-08-20
Attending: FAMILY MEDICINE

## 2024-08-20 VITALS
DIASTOLIC BLOOD PRESSURE: 82 MMHG | HEIGHT: 71 IN | SYSTOLIC BLOOD PRESSURE: 124 MMHG | HEART RATE: 89 BPM | OXYGEN SATURATION: 99 % | BODY MASS INDEX: 24.19 KG/M2 | WEIGHT: 172.81 LBS | TEMPERATURE: 98 F

## 2024-08-20 DIAGNOSIS — Z13.1 ENCOUNTER FOR SCREENING FOR DIABETES MELLITUS: ICD-10-CM

## 2024-08-20 DIAGNOSIS — Z23 IMMUNIZATION DUE: ICD-10-CM

## 2024-08-20 DIAGNOSIS — I10 ESSENTIAL HYPERTENSION: ICD-10-CM

## 2024-08-20 DIAGNOSIS — Z12.5 PROSTATE CANCER SCREENING: ICD-10-CM

## 2024-08-20 DIAGNOSIS — N52.9 ERECTILE DYSFUNCTION, UNSPECIFIED ERECTILE DYSFUNCTION TYPE: ICD-10-CM

## 2024-08-20 DIAGNOSIS — Z12.11 COLON CANCER SCREENING: ICD-10-CM

## 2024-08-20 DIAGNOSIS — Z00.01 ENCOUNTER FOR GENERAL ADULT MEDICAL EXAMINATION WITH ABNORMAL FINDINGS: Primary | ICD-10-CM

## 2024-08-20 DIAGNOSIS — R00.0 TACHYCARDIA: ICD-10-CM

## 2024-08-20 DIAGNOSIS — Z11.3 SCREEN FOR STD (SEXUALLY TRANSMITTED DISEASE): ICD-10-CM

## 2024-08-20 DIAGNOSIS — Z79.899 ENCOUNTER FOR LONG-TERM CURRENT USE OF MEDICATION: ICD-10-CM

## 2024-08-20 DIAGNOSIS — T50.2X5A DIURETIC-INDUCED HYPOKALEMIA: ICD-10-CM

## 2024-08-20 DIAGNOSIS — E87.6 DIURETIC-INDUCED HYPOKALEMIA: ICD-10-CM

## 2024-08-20 PROCEDURE — 84443 ASSAY THYROID STIM HORMONE: CPT | Performed by: FAMILY MEDICINE

## 2024-08-20 PROCEDURE — 86803 HEPATITIS C AB TEST: CPT | Performed by: FAMILY MEDICINE

## 2024-08-20 PROCEDURE — 80061 LIPID PANEL: CPT | Performed by: FAMILY MEDICINE

## 2024-08-20 PROCEDURE — 90715 TDAP VACCINE 7 YRS/> IM: CPT | Mod: PBBFAC,PN

## 2024-08-20 PROCEDURE — 99999 PR PBB SHADOW E&M-EST. PATIENT-LVL IV: CPT | Mod: PBBFAC,,, | Performed by: FAMILY MEDICINE

## 2024-08-20 PROCEDURE — 84153 ASSAY OF PSA TOTAL: CPT | Performed by: FAMILY MEDICINE

## 2024-08-20 PROCEDURE — 99999PBSHW PR PBB SHADOW TECHNICAL ONLY FILED TO HB: Mod: PBBFAC,,,

## 2024-08-20 PROCEDURE — 99214 OFFICE O/P EST MOD 30 MIN: CPT | Mod: PBBFAC,PN | Performed by: FAMILY MEDICINE

## 2024-08-20 PROCEDURE — 87340 HEPATITIS B SURFACE AG IA: CPT | Performed by: FAMILY MEDICINE

## 2024-08-20 PROCEDURE — 80053 COMPREHEN METABOLIC PANEL: CPT | Performed by: FAMILY MEDICINE

## 2024-08-20 PROCEDURE — 84403 ASSAY OF TOTAL TESTOSTERONE: CPT | Performed by: FAMILY MEDICINE

## 2024-08-20 PROCEDURE — 86593 SYPHILIS TEST NON-TREP QUANT: CPT | Performed by: FAMILY MEDICINE

## 2024-08-20 PROCEDURE — 87389 HIV-1 AG W/HIV-1&-2 AB AG IA: CPT | Performed by: FAMILY MEDICINE

## 2024-08-20 PROCEDURE — 83036 HEMOGLOBIN GLYCOSYLATED A1C: CPT | Performed by: FAMILY MEDICINE

## 2024-08-20 PROCEDURE — 84402 ASSAY OF FREE TESTOSTERONE: CPT | Performed by: FAMILY MEDICINE

## 2024-08-20 PROCEDURE — 90471 IMMUNIZATION ADMIN: CPT | Mod: PBBFAC,PN

## 2024-08-20 PROCEDURE — 99213 OFFICE O/P EST LOW 20 MIN: CPT | Mod: S$PBB,25,, | Performed by: FAMILY MEDICINE

## 2024-08-20 PROCEDURE — 99386 PREV VISIT NEW AGE 40-64: CPT | Mod: S$PBB,,, | Performed by: FAMILY MEDICINE

## 2024-08-20 RX ORDER — HYDROCHLOROTHIAZIDE 25 MG/1
25 TABLET ORAL
COMMUNITY
Start: 2024-08-02 | End: 2024-08-20 | Stop reason: SDUPTHER

## 2024-08-20 RX ORDER — POTASSIUM CHLORIDE 20 MEQ/1
20 TABLET, EXTENDED RELEASE ORAL DAILY
Qty: 90 TABLET | Refills: 3 | Status: SHIPPED | OUTPATIENT
Start: 2024-08-20

## 2024-08-20 RX ORDER — HYDROCHLOROTHIAZIDE 25 MG/1
25 TABLET ORAL DAILY
Qty: 90 TABLET | Refills: 3 | Status: SHIPPED | OUTPATIENT
Start: 2024-08-20

## 2024-08-20 RX ORDER — AMLODIPINE BESYLATE 5 MG/1
5 TABLET ORAL DAILY
Qty: 90 TABLET | Refills: 3 | Status: SHIPPED | OUTPATIENT
Start: 2024-08-20 | End: 2025-08-20

## 2024-08-20 RX ADMIN — TETANUS TOXOID, REDUCED DIPHTHERIA TOXOID AND ACELLULAR PERTUSSIS VACCINE, ADSORBED 0.5 ML: 5; 2.5; 8; 8; 2.5 SUSPENSION INTRAMUSCULAR at 10:08

## 2024-08-20 NOTE — PROGRESS NOTES
Subjective:      Chief Complaint   Patient presents with    [facility]     Bp meds, tachycardia, ED      Patient ID: Ty Jorge is a 44 y.o. male.  History of Present Illness    CHIEF COMPLAINT:  Ty presents today to establish care and discuss hypertension management.    HYPERTENSION:  He reports a history of hypertension with blood pressure fluctuations, recently measuring as high as 160/100. He is currently taking hydrochlorothiazide 25 mg and was prescribed amlodipine 10 mg, but has not started it yet. He denies any swelling in his feet. His blood pressure and pulse have been noted to be high at times during plasma donations. Denies sxs.    CARDIOVASCULAR RISK FACTORS:  He acknowledges smoking, having switched from cigarettes to vaping, but recognizes the need to quit due to its impact on his blood pressure. He typically donates plasma once or twice weekly for monetary purposes.    MEDICAL HISTORY:  He has a history of elevated creatinine, indicating potential kidney function issues. His last comprehensive labs was performed in 2022, which was noted to be incomplete. He is due for a full panel of tests including cholesterol, A1C, and other standard screenings appropriate for his age of 44 years.    SEXUAL HEALTH:  He reports concerns about erectile dysfunction, specifically difficulty maintaining an erection during sexual activity. He denies issues with initial arousal but expresses problems with sustaining an erection.    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 It is his second day on the job.    SOCIAL HISTORY:  He owns multiple dogs and cats.    ROS:  General: -fever, -chills, -fatigue, -weight gain, -weight loss  Eyes: -vision changes, -redness, -discharge  ENT: -ear pain, -nasal congestion, -sore throat  Cardiovascular: -chest pain, -palpitations, -lower extremity edema  Respiratory: -cough, -shortness of  breath  Gastrointestinal: -abdominal pain, -nausea, -vomiting, -diarrhea, -constipation, -blood in stool  Genitourinary: -dysuria, -hematuria, -frequency  Musculoskeletal: -joint pain, -muscle pain  Skin: -rash, -lesion  Neurological: -headache, -dizziness, -numbness, -tingling  Psychiatric: -anxiety, -depression, -sleep difficulty  Male Genitourinary: +erectile dysfunction       Ty Conroy allergies, medications, history, and problem list were updated as appropriate.  Past Medical History:   Diagnosis Date    Hypertension     Recurrent boils      Family History   Problem Relation Name Age of Onset    Liver cancer Mother       Social History     Socioeconomic History    Marital status: Single   Tobacco Use    Smoking status: Every Day     Types: Vaping with nicotine     Passive exposure: Never    Smokeless tobacco: Never    Tobacco comments:     pack can last up to a week   Substance and Sexual Activity    Alcohol use: Yes     Comment: socially    Drug use: No    Sexual activity: Yes     Outpatient Encounter Medications as of 8/20/2024   Medication Sig Dispense Refill    [DISCONTINUED] hydroCHLOROthiazide (HYDRODIURIL) 25 MG tablet Take 25 mg by mouth.      amLODIPine (NORVASC) 5 MG tablet Take 1 tablet (5 mg total) by mouth once daily. 90 tablet 3    hydroCHLOROthiazide (HYDRODIURIL) 25 MG tablet Take 1 tablet (25 mg total) by mouth once daily. 90 tablet 3    potassium chloride SA (K-DUR,KLOR-CON) 20 MEQ tablet Take 1 tablet (20 mEq total) by mouth once daily. 90 tablet 3    [DISCONTINUED] amLODIPine (NORVASC) 10 MG tablet Take 1 tablet (10 mg total) by mouth once daily. (Patient not taking: Reported on 8/20/2024) 30 tablet 0     Facility-Administered Encounter Medications as of 8/20/2024   Medication Dose Route Frequency Provider Last Rate Last Admin    DIPH,PERTUSS(ACEL),TET VAC(PF)(ADULT)(ADACEL)(TDaP)  0.5 mL Intramuscular 1 time in Clinic/HOD         [DISCONTINUED] DIPH,PERTUSS(ACEL),TET  "VAC(PF)(ADULT)(ADACEL)(TDaP)  0.5 mL Intramuscular 1 time in Clinic/HOD               Objective:      /82 (BP Location: Left arm, Patient Position: Sitting, BP Method: Medium (Manual))   Pulse 89   Temp 98.1 °F (36.7 °C)   Ht 5' 11" (1.803 m)   Wt 78.4 kg (172 lb 12.8 oz)   SpO2 99%   BMI 24.10 kg/m²   Physical Exam    General: In no acute distress.  Head: Normocephalic. Non traumatic.  Eyes: PERRLA. EOMs full. Conjunctivae clear. Fundi grossly normal.  Ears: EACs clear. TMs normal.  Nose: Mucosa pink. Mucosa moist. No obstruction.  Throat: Clear. No exudates. No lesions.  Neck: Supple. No masses. No thyromegaly. No bruits.  Chest: Lungs clear. No rales. No rhonchi. No wheezes.  Heart: RRR. No murmurs. No rubs. No gallops.  Abdomen: Soft. No tenderness. No masses. BS normal.  : Normal external genitalia. No lesions. No discharge. No hernias  noted.  Back: Normal curvature. No scoliosis. No tenderness.  Extremities: Warm. Well perfused. No upper extremity edema. No lower extremity edema. FROM. No deformities. No joint erythema.  Neuro: No focal deficits appreciated. Good muscle tone. Normal response to visual stimuli. Normal response to auditory stimuli.  Skin: Normal. No rashes. No lesions noted.          Physical Exam  Cardiovascular:      Rate and Rhythm: Normal rate and regular rhythm.      Pulses: Normal pulses.      Heart sounds: Normal heart sounds.   Pulmonary:      Effort: Pulmonary effort is normal. No respiratory distress.      Breath sounds: Normal breath sounds.   Abdominal:      General: Abdomen is flat. Bowel sounds are normal.      Palpations: Abdomen is soft.      Tenderness: There is no abdominal tenderness.   Neurological:      Mental Status: He is alert.        Results for orders placed or performed during the hospital encounter of 04/06/22   POCT glucose   Result Value Ref Range    POCT Glucose 97 70 - 110 mg/dL     .  Assessment/Plan:         1. Encounter for general adult medical " examination with abnormal findings    2. Prostate cancer screening    3. Essential hypertension    4. Encounter for screening for diabetes mellitus    5. Screen for STD (sexually transmitted disease)    6. Encounter for long-term current use of medication    7. Colon cancer screening    8. Immunization due    9. Tachycardia    10. Erectile dysfunction, unspecified erectile dysfunction type    11. Diuretic-induced hypokalemia      Encounter for general adult medical examination with abnormal findings  Comments:  Tachycardia and ED    Prostate cancer screening  -     PSA, Screening; Future; Expected date: 08/20/2024    Essential hypertension  -     Cancel: CBC Auto Differential; Future; Expected date: 08/20/2024  -     Cancel: Comprehensive Metabolic Panel; Future; Expected date: 08/20/2024  -     Microalbumin/Creatinine Ratio, Urine; Future; Expected date: 08/20/2024  -     Lipid Panel; Future; Expected date: 08/20/2024  -     amLODIPine (NORVASC) 5 MG tablet; Take 1 tablet (5 mg total) by mouth once daily.  Dispense: 90 tablet; Refill: 3  -     Comprehensive Metabolic Panel; Future; Expected date: 08/20/2024  -     hydroCHLOROthiazide (HYDRODIURIL) 25 MG tablet; Take 1 tablet (25 mg total) by mouth once daily.  Dispense: 90 tablet; Refill: 3    Encounter for screening for diabetes mellitus  -     Hemoglobin A1C; Future; Expected date: 08/20/2024    Screen for STD (sexually transmitted disease)  -     Treponema Pallidium Antibodies IgG, IgM; Future; Expected date: 08/20/2024  -     Hepatitis C Antibody; Future; Expected date: 08/20/2024  -     HIV 1/2 Ag/Ab (4th Gen); Future; Expected date: 08/20/2024  -     C. trachomatis/N. gonorrhoeae by AMP DNA; Future; Expected date: 08/20/2024  -     Hepatitis B Surface Antigen; Future; Expected date: 08/20/2024    Encounter for long-term current use of medication  -     TSH; Future; Expected date: 08/20/2024    Colon cancer screening  -     Fecal Immunochemical Test (iFOBT);  Future; Expected date: 08/20/2024    Immunization due  -     Discontinue: DIPH,PERTUSS(ACEL),TET VAC(PF)(ADULT)(ADACEL)(TDaP)  -     DIPH,PERTUSS(ACEL),TET VAC(PF)(ADULT)(ADACEL)(TDaP)    Tachycardia  -     EKG 12-lead; Future; Expected date: 08/20/2024    Erectile dysfunction, unspecified erectile dysfunction type  -     Testosterone; Future; Expected date: 08/20/2024  -     Testosterone, free; Future; Expected date: 08/20/2024    Diuretic-induced hypokalemia  -     potassium chloride SA (K-DUR,KLOR-CON) 20 MEQ tablet; Take 1 tablet (20 mEq total) by mouth once daily.  Dispense: 90 tablet; Refill: 3      HYPERTENSION:  - Assessed patient's hypertension management, noting fluctuating blood pressure readings.  - Decreased amlodipine from 10 mg to 5 mg daily.  - Continued hydrochlorothiazide 25 mg daily.  - Started potassium supplement to maintain potassium levels due to hydrochlorothiazide use.  - Ordered EKG to evaluate elevated pulse rate.  KIDNEY FUNCTION:  - Evaluated kidney function based on slightly elevated creatinine from recent labs.  - Ordered comprehensive metabolic panel to recheck creatinine and assess overall kidney function.  CANCER SCREENING:  - Planned age-appropriate cancer screenings including PSA and colon cancer.  - Ordered PSA test for prostate cancer screening.  - Ordered fecal occult blood test for colon cancer screening.  CARDIOVASCULAR HEALTH:  - Considered impact of plasma donation and nicotine use on patient's cardiovascular health.  - Emphasized the importance of avoiding nicotine due to its effects on blood pressure and overall health.  - Discussed the potential impact of plasma donation on fluid balance and the need for adequate hydration.  - Ty to quit nicotine..  - Ty to increase fluid intake, especially when donating plasma.    ERECTILE DYSFUNCTION:  - Will investigate patient's reported erectile dysfunction by checking testosterone levels.  - Ordered testosterone level to  evaluate erectile dysfunction.  LABS:  - Ordered lipid panel to evaluate cholesterol levels.  - Ordered hemoglobin A1C to screen for diabetes.  - Ordered CBC to reassess platelet levels.  FOLLOW UP:  - Contact the office when labs and test results are ready for review.  - Follow up appointment to be scheduled after review of test results.            I have reviewed all of the patient's clinical history available in care everywhere and Epic and have utilized this in my evaluation and management recommendations today.      Treatment options and alternatives were discussed with the patient. Patient was given ample time to ask questions. All questions were answered. Voices understanding and acceptance of this advice. Will call back if any further questions or concerns.           This note was generated with the assistance of ambient listening technology. Verbal consent was obtained by the patient and accompanying visitor(s) for the recording of patient appointment to facilitate this note. I attest to having reviewed and edited the generated note for accuracy, though some syntax or spelling errors may persist. Please contact the author of this note for any clarification.       I spent a total of 70 minutes face to face and non-face to face on the date of this visit.This includes time preparing to see the patient (eg, review of tests, notes), obtaining and/or reviewing additional history from an independent historian and/or outside medical records, documenting clinical information in the electronic health record, independently interpreting results and/or communicating results to the patient/family/caregiver, or care coordinator.  Visit today included increased complexity associated with the care of the episodic problem addressed and managing the longitudinal care of the patient due to the serious and/or complex managed problem(s).     Annabelle Uribe MD  [facility],

## 2024-08-20 NOTE — LETTER
August 20, 2024      [facility]  [address] [address]       Patient: Ty Jorge   YOB: 1980  Date of Visit: 08/20/2024    To Whom It May Concern:    Ana Rosa Jorge  was at [facility] on 08/20/2024. The patient may return to work/school on 08/21/2024 without restrictions. If you have any questions or concerns, or if I can be of further assistance, please do not hesitate to contact me.    Sincerely,    Annabelle Uribe MD

## 2024-08-21 LAB
ALBUMIN SERPL BCP-MCNC: 4.4 G/DL (ref 3.5–5.2)
ALP SERPL-CCNC: 70 U/L (ref 55–135)
ALT SERPL W/O P-5'-P-CCNC: 33 U/L (ref 10–44)
ANION GAP SERPL CALC-SCNC: 10 MMOL/L (ref 8–16)
AST SERPL-CCNC: 37 U/L (ref 10–40)
BILIRUB SERPL-MCNC: 0.4 MG/DL (ref 0.1–1)
BUN SERPL-MCNC: 26 MG/DL (ref 6–20)
CALCIUM SERPL-MCNC: 10 MG/DL (ref 8.7–10.5)
CHLORIDE SERPL-SCNC: 101 MMOL/L (ref 95–110)
CHOLEST SERPL-MCNC: 186 MG/DL (ref 120–199)
CHOLEST/HDLC SERPL: 3 {RATIO} (ref 2–5)
CO2 SERPL-SCNC: 26 MMOL/L (ref 23–29)
COMPLEXED PSA SERPL-MCNC: 0.77 NG/ML (ref 0–4)
CREAT SERPL-MCNC: 1.5 MG/DL (ref 0.5–1.4)
EST. GFR  (NO RACE VARIABLE): 58.1 ML/MIN/1.73 M^2
ESTIMATED AVG GLUCOSE: 103 MG/DL (ref 68–131)
GLUCOSE SERPL-MCNC: 91 MG/DL (ref 70–110)
HBA1C MFR BLD: 5.2 % (ref 4–5.6)
HBV SURFACE AG SERPL QL IA: NORMAL
HCV AB SERPL QL IA: NORMAL
HDLC SERPL-MCNC: 61 MG/DL (ref 40–75)
HDLC SERPL: 32.8 % (ref 20–50)
HIV 1+2 AB+HIV1 P24 AG SERPL QL IA: NORMAL
LDLC SERPL CALC-MCNC: 108.2 MG/DL (ref 63–159)
NONHDLC SERPL-MCNC: 125 MG/DL
POTASSIUM SERPL-SCNC: 4 MMOL/L (ref 3.5–5.1)
PROT SERPL-MCNC: 7.6 G/DL (ref 6–8.4)
SODIUM SERPL-SCNC: 137 MMOL/L (ref 136–145)
TESTOST SERPL-MCNC: 790 NG/DL (ref 304–1227)
TREPONEMA PALLIDUM IGG+IGM AB [PRESENCE] IN SERUM OR PLASMA BY IMMUNOASSAY: NONREACTIVE
TRIGL SERPL-MCNC: 84 MG/DL (ref 30–150)
TSH SERPL DL<=0.005 MIU/L-ACNC: 0.66 UIU/ML (ref 0.4–4)

## 2024-08-22 ENCOUNTER — TELEPHONE (OUTPATIENT)
Dept: PRIMARY CARE CLINIC | Facility: CLINIC | Age: 44
End: 2024-08-22

## 2024-08-22 DIAGNOSIS — N17.9 AKI (ACUTE KIDNEY INJURY): Primary | ICD-10-CM

## 2024-08-22 NOTE — PROGRESS NOTES
Abnormal lab result, EDY  All your lab work is normal except that you have acute kidney injury. Drink a lot of fluid-up to 8-10 glasses today and come by lab to repeat bmp tomorrow. This will not be a fasting lab and will help us assess the effect of fluids on you.

## 2024-08-23 ENCOUNTER — TELEPHONE (OUTPATIENT)
Dept: PRIMARY CARE CLINIC | Facility: CLINIC | Age: 44
End: 2024-08-23

## 2024-08-26 LAB — TESTOST FREE SERPL-MCNC: 11.5 PG/ML (ref 5.1–41.5)

## 2025-01-30 DIAGNOSIS — T50.2X5A DIURETIC-INDUCED HYPOKALEMIA: ICD-10-CM

## 2025-01-30 DIAGNOSIS — E87.6 DIURETIC-INDUCED HYPOKALEMIA: ICD-10-CM

## 2025-01-30 DIAGNOSIS — I10 ESSENTIAL HYPERTENSION: ICD-10-CM

## 2025-01-30 NOTE — TELEPHONE ENCOUNTER
Called to inform pt of first available appointment. Pt appointment is scheduled for 02/06/2025 at 3:00 pm. Pt voiced understanding.     ----- Message from Summer sent at 1/30/2025  3:53 PM CST -----  Contact: Lontiece/friend  Type:  Sooner Apoointment Request    Caller is requesting a sooner appointment.  Caller declined first available appointment listed below.  Caller will not accept being placed on the waitlist and is requesting a message be sent to doctor.  Name of Caller:Ty  When is the first available appointment? Unknown  Symptoms: Est care  Would the patient rather a call back or a response via [facility]? Call back  Best Call Back Number: [phone] (home)   Additional Information:

## 2025-02-06 ENCOUNTER — OFFICE VISIT (OUTPATIENT)
Dept: PRIMARY CARE CLINIC | Facility: CLINIC | Age: 45
End: 2025-02-06
Payer: COMMERCIAL

## 2025-02-06 ENCOUNTER — LAB VISIT (OUTPATIENT)
Dept: LAB | Facility: HOSPITAL | Age: 45
End: 2025-02-06
Attending: FAMILY MEDICINE
Payer: COMMERCIAL

## 2025-02-06 VITALS
SYSTOLIC BLOOD PRESSURE: 138 MMHG | OXYGEN SATURATION: 97 % | TEMPERATURE: 98 F | BODY MASS INDEX: 24.73 KG/M2 | HEART RATE: 115 BPM | DIASTOLIC BLOOD PRESSURE: 86 MMHG | HEIGHT: 71 IN | WEIGHT: 176.63 LBS

## 2025-02-06 DIAGNOSIS — I10 ESSENTIAL HYPERTENSION: ICD-10-CM

## 2025-02-06 DIAGNOSIS — M79.602 LEFT ARM PAIN: ICD-10-CM

## 2025-02-06 DIAGNOSIS — I10 ESSENTIAL HYPERTENSION: Primary | ICD-10-CM

## 2025-02-06 DIAGNOSIS — N17.9 AKI (ACUTE KIDNEY INJURY): ICD-10-CM

## 2025-02-06 LAB
ALBUMIN SERPL BCP-MCNC: 4.4 G/DL (ref 3.5–5.2)
ALP SERPL-CCNC: 68 U/L (ref 40–150)
ALT SERPL W/O P-5'-P-CCNC: 28 U/L (ref 10–44)
ANION GAP SERPL CALC-SCNC: 12 MMOL/L (ref 8–16)
ANION GAP SERPL CALC-SCNC: 12 MMOL/L (ref 8–16)
AST SERPL-CCNC: 33 U/L (ref 10–40)
BASOPHILS # BLD AUTO: 0.03 K/UL (ref 0–0.2)
BASOPHILS NFR BLD: 0.7 % (ref 0–1.9)
BILIRUB SERPL-MCNC: 0.4 MG/DL (ref 0.1–1)
BUN SERPL-MCNC: 16 MG/DL (ref 6–20)
BUN SERPL-MCNC: 16 MG/DL (ref 6–20)
CALCIUM SERPL-MCNC: 9.3 MG/DL (ref 8.7–10.5)
CALCIUM SERPL-MCNC: 9.3 MG/DL (ref 8.7–10.5)
CHLORIDE SERPL-SCNC: 105 MMOL/L (ref 95–110)
CHLORIDE SERPL-SCNC: 105 MMOL/L (ref 95–110)
CHOLEST SERPL-MCNC: 187 MG/DL (ref 120–199)
CHOLEST/HDLC SERPL: 3 {RATIO} (ref 2–5)
CO2 SERPL-SCNC: 23 MMOL/L (ref 23–29)
CO2 SERPL-SCNC: 23 MMOL/L (ref 23–29)
CREAT SERPL-MCNC: 1.4 MG/DL (ref 0.5–1.4)
CREAT SERPL-MCNC: 1.4 MG/DL (ref 0.5–1.4)
DIFFERENTIAL METHOD BLD: ABNORMAL
EOSINOPHIL # BLD AUTO: 0.1 K/UL (ref 0–0.5)
EOSINOPHIL NFR BLD: 3 % (ref 0–8)
ERYTHROCYTE [DISTWIDTH] IN BLOOD BY AUTOMATED COUNT: 13 % (ref 11.5–14.5)
EST. GFR  (NO RACE VARIABLE): >60 ML/MIN/1.73 M^2
EST. GFR  (NO RACE VARIABLE): >60 ML/MIN/1.73 M^2
GLUCOSE SERPL-MCNC: 66 MG/DL (ref 70–110)
GLUCOSE SERPL-MCNC: 66 MG/DL (ref 70–110)
HCT VFR BLD AUTO: 42.3 % (ref 40–54)
HDLC SERPL-MCNC: 63 MG/DL (ref 40–75)
HDLC SERPL: 33.7 % (ref 20–50)
HGB BLD-MCNC: 14.4 G/DL (ref 14–18)
IMM GRANULOCYTES # BLD AUTO: 0 K/UL (ref 0–0.04)
IMM GRANULOCYTES NFR BLD AUTO: 0 % (ref 0–0.5)
LDLC SERPL CALC-MCNC: 90 MG/DL (ref 63–159)
LYMPHOCYTES # BLD AUTO: 2 K/UL (ref 1–4.8)
LYMPHOCYTES NFR BLD: 48.5 % (ref 18–48)
MCH RBC QN AUTO: 31.1 PG (ref 27–31)
MCHC RBC AUTO-ENTMCNC: 34 G/DL (ref 32–36)
MCV RBC AUTO: 91 FL (ref 82–98)
MONOCYTES # BLD AUTO: 0.3 K/UL (ref 0.3–1)
MONOCYTES NFR BLD: 8 % (ref 4–15)
NEUTROPHILS # BLD AUTO: 1.6 K/UL (ref 1.8–7.7)
NEUTROPHILS NFR BLD: 39.8 % (ref 38–73)
NONHDLC SERPL-MCNC: 124 MG/DL
NRBC BLD-RTO: 0 /100 WBC
PLATELET # BLD AUTO: 257 K/UL (ref 150–450)
PMV BLD AUTO: 10.7 FL (ref 9.2–12.9)
POTASSIUM SERPL-SCNC: 3.9 MMOL/L (ref 3.5–5.1)
POTASSIUM SERPL-SCNC: 3.9 MMOL/L (ref 3.5–5.1)
PROT SERPL-MCNC: 7.6 G/DL (ref 6–8.4)
RBC # BLD AUTO: 4.63 M/UL (ref 4.6–6.2)
SODIUM SERPL-SCNC: 140 MMOL/L (ref 136–145)
SODIUM SERPL-SCNC: 140 MMOL/L (ref 136–145)
TRIGL SERPL-MCNC: 170 MG/DL (ref 30–150)
WBC # BLD AUTO: 4.02 K/UL (ref 3.9–12.7)

## 2025-02-06 PROCEDURE — 85025 COMPLETE CBC W/AUTO DIFF WBC: CPT | Performed by: NURSE PRACTITIONER

## 2025-02-06 PROCEDURE — 99214 OFFICE O/P EST MOD 30 MIN: CPT | Mod: S$GLB,,, | Performed by: NURSE PRACTITIONER

## 2025-02-06 PROCEDURE — 99999 PR PBB SHADOW E&M-EST. PATIENT-LVL III: CPT | Mod: PBBFAC,,, | Performed by: NURSE PRACTITIONER

## 2025-02-06 PROCEDURE — 80053 COMPREHEN METABOLIC PANEL: CPT | Performed by: NURSE PRACTITIONER

## 2025-02-06 PROCEDURE — 3008F BODY MASS INDEX DOCD: CPT | Mod: CPTII,S$GLB,, | Performed by: NURSE PRACTITIONER

## 2025-02-06 PROCEDURE — 36415 COLL VENOUS BLD VENIPUNCTURE: CPT | Mod: PN | Performed by: NURSE PRACTITIONER

## 2025-02-06 PROCEDURE — 1159F MED LIST DOCD IN RCRD: CPT | Mod: CPTII,S$GLB,, | Performed by: NURSE PRACTITIONER

## 2025-02-06 PROCEDURE — 80061 LIPID PANEL: CPT | Performed by: NURSE PRACTITIONER

## 2025-02-06 PROCEDURE — 3075F SYST BP GE 130 - 139MM HG: CPT | Mod: CPTII,S$GLB,, | Performed by: NURSE PRACTITIONER

## 2025-02-06 PROCEDURE — 3079F DIAST BP 80-89 MM HG: CPT | Mod: CPTII,S$GLB,, | Performed by: NURSE PRACTITIONER

## 2025-02-06 PROCEDURE — 1160F RVW MEDS BY RX/DR IN RCRD: CPT | Mod: CPTII,S$GLB,, | Performed by: NURSE PRACTITIONER

## 2025-02-06 RX ORDER — POTASSIUM CHLORIDE 20 MEQ/1
20 TABLET, EXTENDED RELEASE ORAL DAILY
Qty: 30 TABLET | Refills: 2 | Status: SHIPPED | OUTPATIENT
Start: 2025-02-06 | End: 2025-05-07

## 2025-02-07 RX ORDER — HYDROCHLOROTHIAZIDE 25 MG/1
25 TABLET ORAL DAILY
Qty: 90 TABLET | Refills: 0 | Status: SHIPPED | OUTPATIENT
Start: 2025-02-07

## 2025-02-07 RX ORDER — POTASSIUM CHLORIDE 20 MEQ/1
20 TABLET, EXTENDED RELEASE ORAL DAILY
Qty: 90 TABLET | Refills: 0 | Status: SHIPPED | OUTPATIENT
Start: 2025-02-07

## 2025-02-07 RX ORDER — AMLODIPINE BESYLATE 5 MG/1
5 TABLET ORAL DAILY
Qty: 90 TABLET | Refills: 0 | Status: SHIPPED | OUTPATIENT
Start: 2025-02-07 | End: 2026-02-07

## 2025-02-07 NOTE — PROGRESS NOTES
Subjective:       Patient ID: Ty Jorge is a 44 y.o. male.    Chief Complaint: Follow-up and left arm pains (X 2 months )        Ty Jorge 44 y.o. male   History of Present Illness    CHIEF COMPLAINT:  - Ty presents for a follow-up visit to address hypertension medication management and left shoulder pain.    HPI:  - Ty reports left shoulder pain ongoing for approximately 1 year, following a previous fall. The pain is primarily located in the joint and present bilaterally, more pronounced on the left side. Discomfort occurs when elevating the arm, with a sensation of bone-on-bone contact at certain points. Pain is most severe nocturnally, particularly when lying supine or prone with the arm under the pillow. The pain is intermittent and position-dependent, with no constant pain reported.  - Ty has been managing hypertension with medication but had a lapse in treatment. A 3-month supply of medication was exhausted about 2 months ago, resulting in a month without medication. Ty recently obtained a new 3-month supply and resumed taking both the diuretic and antihypertensive medication the night before the visit.  - Ty notes an elevated heart rate today, which he attributes to recent cigarette smoking. He denies any associated dyspnea or chest pain.  - For shoulder pain management, the patient has not been taking any regular medication. He occasionally uses OTC analgesics, noting that Excedrin Back & Body and ibuprofen 800mg have been effective in the past. Ty prefers anti-inflammatory medication over physical therapy due to work and family commitments.    MEDICATIONS:  - Lisinopril, for blood pressure  - Hydrochlorothiazide, for blood pressure (diuretic)  - Ibuprofen, as needed for joint pain  - Excedrin, as needed for pain    MEDICAL HISTORY:  - Hypertension    TEST RESULTS:  - Potassium: August 2024, normal level    SOCIAL HISTORY:  - Smoking: Current smoker      ROS:  General:  "-fever, -chills, -fatigue, -weight gain, -weight loss  Eyes: -vision changes, -redness, -discharge  ENT: -ear pain, -nasal congestion, -sore throat  Cardiovascular: -chest pain, -palpitations, -lower extremity edema  Respiratory: -cough, -shortness of breath  Gastrointestinal: -abdominal pain, -nausea, -vomiting, -diarrhea, -constipation, -blood in stool  Genitourinary: -dysuria, -hematuria, -frequency  Musculoskeletal: +joint pain, -muscle pain  Skin: -rash, -lesion  Neurological: -headache, -dizziness, -numbness, -tingling  Psychiatric: -anxiety, -depression, -sleep difficulty        Past Medical History:   Diagnosis Date    Hypertension     Recurrent boils      Family History   Problem Relation Name Age of Onset    Liver cancer Mother       Social History     Socioeconomic History    Marital status: Single   Tobacco Use    Smoking status: Every Day     Types: Vaping with nicotine     Passive exposure: Never    Smokeless tobacco: Never    Tobacco comments:     pack can last up to a week   Substance and Sexual Activity    Alcohol use: Yes     Comment: socially    Drug use: No    Sexual activity: Yes     Outpatient Encounter Medications as of 2/6/2025   Medication Sig Dispense Refill    amLODIPine (NORVASC) 5 MG tablet Take 1 tablet (5 mg total) by mouth once daily. 90 tablet 3    hydroCHLOROthiazide (HYDRODIURIL) 25 MG tablet Take 1 tablet (25 mg total) by mouth once daily. 90 tablet 3    [DISCONTINUED] potassium chloride SA (K-DUR,KLOR-CON) 20 MEQ tablet Take 1 tablet (20 mEq total) by mouth once daily. 90 tablet 3    potassium chloride SA (K-DUR,KLOR-CON) 20 MEQ tablet Take 1 tablet (20 mEq total) by mouth once daily. 30 tablet 2     No facility-administered encounter medications on file as of 2/6/2025.           Objective:      /86   Pulse (!) 115   Temp 98.1 °F (36.7 °C) (Oral)   Ht 5' 11" (1.803 m)   Wt 80.1 kg (176 lb 9.6 oz)   SpO2 97%   BMI 24.63 kg/m²   Physical Exam    General: In no acute " distress.  Head: Normocephalic. Non traumatic.  Eyes: PERRLA. EOMs full. Conjunctivae clear. Fundi grossly normal.  Ears: EACs clear. TMs normal.  Nose: Mucosa pink. Mucosa moist. No obstruction.  Throat: Clear. No exudates. No lesions.  Neck: Supple. No masses. No thyromegaly. No bruits.  Chest: Lungs clear. No rales. No rhonchi. No wheezes.  Heart: RRR. No murmurs. No rubs. No gallops.  Abdomen: Soft. No tenderness. No masses. BS normal.  : Normal external genitalia. No lesions. No discharge. No hernias  noted.  Back: Normal curvature. No scoliosis. No tenderness.  Extremities: Warm. Well perfused. No upper extremity edema. No lower extremity edema. FROM. No deformities. No joint erythema.  Neuro: No focal deficits appreciated. Good muscle tone. Normal response to visual stimuli. Normal response to auditory stimuli.  Skin: Normal. No rashes. No lesions noted.  Vitals: Normal blood pressure. TACHYCARDIA.            Results for orders placed or performed in visit on 08/20/24   Microalbumin/Creatinine Ratio, Urine    Collection Time: 08/20/24 12:15 PM   Result Value Ref Range    Microalbumin, Urine 6.0 ug/mL    Creatinine, Urine 137.0 23.0 - 375.0 mg/dL    Microalb/Creat Ratio 4.4 0.0 - 30.0 ug/mg   C. trachomatis/N. gonorrhoeae by AMP DNA    Collection Time: 08/20/24 12:15 PM   Result Value Ref Range    Chlamydia, Amplified DNA Not Detected Not Detected    N gonorrhoeae, amplified DNA Not Detected Not Detected     Assessment & Plan    HYPERTENSION:  - Assessed blood pressure, which has improved since last visit.  - Reviewed labs for hypertension management, including complete blood count, liver function tests, kidney function tests, and lipid panel.  - Restarted lisinopril and hydrochlorothiazide for hypertension management after a one-month gap.  - Ordered labs: complete blood count, liver and kidney function tests, lipid panel, and serum potassium level.  - Scheduled follow-up visit in 3-4 months.  - Expressed  concern about potential hypokalemia due to hydrochlorothiazide use.  - Reviewed previous potassium levels, which were within normal range.  - Educated the patient about the effects of hydrochlorothiazide on potassium levels and associated risks, including muscle pain and potential chest pain in severe cases.  - Emphasized the importance of potassium supplementation when taking hydrochlorothiazide.  - Prescribed potassium supplement to be picked up at the pharmacy.  - Ordered serum potassium level check as part of routine labs.  - Reviewed previous lipid panel results.  - Ordered lipid panel as part of routine labs for hypertension management.    SHOULDER PAIN:  - Evaluated bilateral shoulder pain, more pronounced in the left shoulder, possibly related to a fall one year ago.  - Noted pain severity of 5-6 out of 10, particularly at night and with certain arm movements.  - Ordered x-ray of left shoulder to rule out effusion or other abnormalities.  - Recommend physical therapy, which patient declined due to work and family commitments.  - Consider prescribing meloxicam, a stronger anti-inflammatory than ibuprofen, for joint pain pending x-ray results and lab review.  - Schedule follow-up after x-ray results and lab review to discuss treatment options.    ELEVATED HEART RATE:  - Noted heart rate is slightly elevated at 115 BPM.  - Evaluated elevated heart rate, possibly attributed to recent smoking.    SMOKING:  - Confirmed patient's current cigarette smoking status.    FOLLOW UP:  - Scheduled follow-up visit in 3-4 months.  - Schedule follow-up after x-ray results and lab review to discuss treatment options.          ICD-10-CM ICD-9-CM   1. Essential hypertension  I10 401.9   2. Left arm pain  M79.602 729.5        This note was generated with the assistance of ambient listening technology. Verbal consent was obtained by the patient and accompanying visitor(s) for the recording of patient appointment to facilitate this  note. I attest to having reviewed and edited the generated note for accuracy, though some syntax or spelling errors may persist. Please contact the author of this note for any clarification.        [facility] [address] [place] [address]  Office [phone]  Fax [phone]

## 2025-02-21 ENCOUNTER — PATIENT MESSAGE (OUTPATIENT)
Dept: ADMINISTRATIVE | Facility: HOSPITAL | Age: 45
End: 2025-02-21
Payer: COMMERCIAL

## 2025-02-21 ENCOUNTER — HOSPITAL ENCOUNTER (OUTPATIENT)
Dept: RADIOLOGY | Facility: HOSPITAL | Age: 45
Discharge: HOME | End: 2025-02-21
Attending: NURSE PRACTITIONER
Payer: COMMERCIAL

## 2025-02-21 DIAGNOSIS — M79.602 LEFT ARM PAIN: ICD-10-CM

## 2025-02-21 DIAGNOSIS — I10 ESSENTIAL HYPERTENSION: ICD-10-CM

## 2025-02-21 PROCEDURE — 73030 X-RAY EXAM OF SHOULDER: CPT | Mod: TC,LT

## 2025-02-21 PROCEDURE — 73030 X-RAY EXAM OF SHOULDER: CPT | Mod: 26,LT,, | Performed by: RADIOLOGY

## 2025-02-27 ENCOUNTER — RESULTS FOLLOW-UP (OUTPATIENT)
Dept: PRIMARY CARE CLINIC | Facility: CLINIC | Age: 45
End: 2025-02-27

## 2025-05-08 ENCOUNTER — PATIENT MESSAGE (OUTPATIENT)
Dept: RESEARCH | Facility: HOSPITAL | Age: 45
End: 2025-05-08
Payer: COMMERCIAL

## 2025-05-13 DIAGNOSIS — I10 ESSENTIAL HYPERTENSION: ICD-10-CM

## 2025-05-14 NOTE — TELEPHONE ENCOUNTER
No care due was identified.  [facility] Embedded Care Due Messages. Reference number: [id number].   5/13/2025 11:10:10 PM CDT

## 2025-05-20 RX ORDER — AMLODIPINE BESYLATE 5 MG/1
5 TABLET ORAL DAILY
Qty: 90 TABLET | Refills: 2 | Status: SHIPPED | OUTPATIENT
Start: 2025-05-20 | End: 2026-05-20

## 2025-05-20 RX ORDER — POTASSIUM CHLORIDE 20 MEQ/1
20 TABLET, EXTENDED RELEASE ORAL DAILY
Qty: 30 TABLET | Refills: 2 | Status: SHIPPED | OUTPATIENT
Start: 2025-05-20 | End: 2025-08-18

## 2025-05-20 RX ORDER — HYDROCHLOROTHIAZIDE 25 MG/1
25 TABLET ORAL DAILY
Qty: 90 TABLET | Refills: 2 | Status: SHIPPED | OUTPATIENT
Start: 2025-05-20

## 2025-07-09 ENCOUNTER — PATIENT OUTREACH (OUTPATIENT)
Dept: ADMINISTRATIVE | Facility: HOSPITAL | Age: 45
End: 2025-07-09

## 2025-07-09 DIAGNOSIS — Z12.11 ENCOUNTER FOR COLORECTAL CANCER SCREENING: Primary | ICD-10-CM

## 2025-07-09 DIAGNOSIS — Z12.12 ENCOUNTER FOR COLORECTAL CANCER SCREENING: Primary | ICD-10-CM

## 2025-07-11 ENCOUNTER — HOSPITAL ENCOUNTER (OUTPATIENT)
Dept: PREADMISSION TESTING | Facility: HOSPITAL | Age: 45
Discharge: HOME | End: 2025-07-11
Attending: FAMILY MEDICINE

## 2025-07-11 DIAGNOSIS — Z12.11 ENCOUNTER FOR COLORECTAL CANCER SCREENING: Primary | ICD-10-CM

## 2025-07-11 DIAGNOSIS — Z12.12 ENCOUNTER FOR COLORECTAL CANCER SCREENING: Primary | ICD-10-CM

## 2025-07-11 RX ORDER — SODIUM, POTASSIUM,MAG SULFATES 17.5-3.13G
1 SOLUTION, RECONSTITUTED, ORAL ORAL DAILY
Qty: 1 KIT | Refills: 0 | Status: SHIPPED | OUTPATIENT
Start: 2025-07-11 | End: 2025-07-13

## 2025-07-23 ENCOUNTER — HOSPITAL ENCOUNTER (OUTPATIENT)
Dept: ENDOSCOPY | Facility: HOSPITAL | Age: 45
Discharge: HOME | End: 2025-07-23
Attending: FAMILY MEDICINE

## (undated) DEVICE — BLADE SURG CARBON STEEL SZ11

## (undated) DEVICE — KIT ANTIFOG

## (undated) DEVICE — CLOSURE SKIN STERI STRIP 1/2X4

## (undated) DEVICE — SUT VICRYL+ 27 UR-6 VIOL

## (undated) DEVICE — APPLICATOR CHLORAPREP ORN 26ML

## (undated) DEVICE — ELECTRODE REM PLYHSV RETURN 9

## (undated) DEVICE — SCISSOR CURVED ENDOPATH 5MM

## (undated) DEVICE — Device

## (undated) DEVICE — TRAY FOLEY 16FR INFECTION CONT

## (undated) DEVICE — TROCAR ENDOPATH XCEL 5X100MM

## (undated) DEVICE — SUT VICRYL PLUS 4-0 P3 18IN

## (undated) DEVICE — TROCAR KII BLLN 12MM 10CM

## (undated) DEVICE — STRAP SECURE 5MM

## (undated) DEVICE — KIT PROCEDURE STER INLET CLOSU

## (undated) DEVICE — GLOVE SURG BIOGEL LATEX SZ 7.5

## (undated) DEVICE — SYR 10CC LUER LOCK

## (undated) DEVICE — DRESSING ADH ISLAND 2.5 X 3

## (undated) DEVICE — COVER OVERHEAD SURG LT BLUE

## (undated) DEVICE — TUBING INSUFFLATION 10

## (undated) DEVICE — NDL HYPO REG 25G X 1 1/2

## (undated) DEVICE — SUT PROLENE 2-0 SH 36IN BLU

## (undated) DEVICE — BLANKET UPPER BODY 78.7X29.9IN

## (undated) DEVICE — SEE L#120831

## (undated) DEVICE — PACK ENDOSCOPY GENERAL

## (undated) DEVICE — ELECTRODE EZ CLEAN L-HOOK 13.5

## (undated) DEVICE — SORBAFIX 30 COUNT.